# Patient Record
Sex: FEMALE | Race: OTHER
[De-identification: names, ages, dates, MRNs, and addresses within clinical notes are randomized per-mention and may not be internally consistent; named-entity substitution may affect disease eponyms.]

---

## 2018-03-14 PROBLEM — Z00.00 ENCOUNTER FOR PREVENTIVE HEALTH EXAMINATION: Status: ACTIVE | Noted: 2018-03-14

## 2018-04-12 ENCOUNTER — APPOINTMENT (OUTPATIENT)
Dept: OBGYN | Facility: CLINIC | Age: 23
End: 2018-04-12

## 2018-04-12 ENCOUNTER — OUTPATIENT (OUTPATIENT)
Dept: OUTPATIENT SERVICES | Facility: HOSPITAL | Age: 23
LOS: 1 days | Discharge: HOME | End: 2018-04-12

## 2018-04-12 ENCOUNTER — RESULT CHARGE (OUTPATIENT)
Age: 23
End: 2018-04-12

## 2018-04-12 VITALS
WEIGHT: 101.38 LBS | SYSTOLIC BLOOD PRESSURE: 100 MMHG | BODY MASS INDEX: 23.46 KG/M2 | DIASTOLIC BLOOD PRESSURE: 60 MMHG | HEIGHT: 55 IN

## 2018-04-12 LAB
HCG UR QL: NEGATIVE
QUALITY CONTROL: YES

## 2018-04-12 RX ORDER — MEDROXYPROGESTERONE ACETATE 150 MG/ML
150 INJECTION, SUSPENSION INTRAMUSCULAR
Qty: 0 | Refills: 0 | Status: COMPLETED | OUTPATIENT
Start: 2018-04-12

## 2018-04-12 RX ADMIN — MEDROXYPROGESTERONE ACETATE 0 MG/ML: 150 INJECTION, SUSPENSION INTRAMUSCULAR at 00:00

## 2018-04-13 DIAGNOSIS — Z30.42 ENCOUNTER FOR SURVEILLANCE OF INJECTABLE CONTRACEPTIVE: ICD-10-CM

## 2018-06-14 ENCOUNTER — APPOINTMENT (OUTPATIENT)
Dept: OBGYN | Facility: CLINIC | Age: 23
End: 2018-06-14

## 2018-06-14 ENCOUNTER — OUTPATIENT (OUTPATIENT)
Dept: OUTPATIENT SERVICES | Facility: HOSPITAL | Age: 23
LOS: 1 days | Discharge: HOME | End: 2018-06-14

## 2018-06-14 VITALS
DIASTOLIC BLOOD PRESSURE: 60 MMHG | HEIGHT: 55 IN | BODY MASS INDEX: 23.23 KG/M2 | WEIGHT: 100.38 LBS | SYSTOLIC BLOOD PRESSURE: 100 MMHG

## 2018-06-14 DIAGNOSIS — Z23 ENCOUNTER FOR IMMUNIZATION: ICD-10-CM

## 2018-06-18 DIAGNOSIS — Z23 ENCOUNTER FOR IMMUNIZATION: ICD-10-CM

## 2018-07-05 ENCOUNTER — APPOINTMENT (OUTPATIENT)
Dept: OBGYN | Facility: CLINIC | Age: 23
End: 2018-07-05

## 2018-07-05 ENCOUNTER — OUTPATIENT (OUTPATIENT)
Dept: OUTPATIENT SERVICES | Facility: HOSPITAL | Age: 23
LOS: 1 days | Discharge: HOME | End: 2018-07-05

## 2018-07-05 VITALS — WEIGHT: 102 LBS | DIASTOLIC BLOOD PRESSURE: 60 MMHG | SYSTOLIC BLOOD PRESSURE: 100 MMHG

## 2018-07-05 RX ORDER — MEDROXYPROGESTERONE ACETATE 150 MG/ML
150 INJECTION, SUSPENSION INTRAMUSCULAR
Qty: 1 | Refills: 0 | Status: ACTIVE | COMMUNITY
Start: 2018-07-05 | End: 1900-01-01

## 2018-07-09 DIAGNOSIS — Z30.42 ENCOUNTER FOR SURVEILLANCE OF INJECTABLE CONTRACEPTIVE: ICD-10-CM

## 2018-08-31 ENCOUNTER — APPOINTMENT (OUTPATIENT)
Dept: OTOLARYNGOLOGY | Facility: CLINIC | Age: 23
End: 2018-08-31

## 2018-09-07 ENCOUNTER — APPOINTMENT (OUTPATIENT)
Dept: GASTROENTEROLOGY | Facility: CLINIC | Age: 23
End: 2018-09-07

## 2018-09-27 ENCOUNTER — OUTPATIENT (OUTPATIENT)
Dept: OUTPATIENT SERVICES | Facility: HOSPITAL | Age: 23
LOS: 1 days | Discharge: HOME | End: 2018-09-27

## 2018-09-27 ENCOUNTER — APPOINTMENT (OUTPATIENT)
Dept: OBGYN | Facility: CLINIC | Age: 23
End: 2018-09-27

## 2018-09-27 VITALS
HEIGHT: 55 IN | BODY MASS INDEX: 23.23 KG/M2 | DIASTOLIC BLOOD PRESSURE: 60 MMHG | WEIGHT: 100.38 LBS | SYSTOLIC BLOOD PRESSURE: 110 MMHG

## 2018-09-28 DIAGNOSIS — Z30.42 ENCOUNTER FOR SURVEILLANCE OF INJECTABLE CONTRACEPTIVE: ICD-10-CM

## 2018-10-19 ENCOUNTER — APPOINTMENT (OUTPATIENT)
Dept: GASTROENTEROLOGY | Facility: CLINIC | Age: 23
End: 2018-10-19

## 2018-10-19 ENCOUNTER — OUTPATIENT (OUTPATIENT)
Dept: OUTPATIENT SERVICES | Facility: HOSPITAL | Age: 23
LOS: 1 days | Discharge: HOME | End: 2018-10-19

## 2018-10-19 VITALS — SYSTOLIC BLOOD PRESSURE: 106 MMHG | DIASTOLIC BLOOD PRESSURE: 75 MMHG | HEART RATE: 65 BPM

## 2018-10-25 ENCOUNTER — APPOINTMENT (OUTPATIENT)
Dept: OBGYN | Facility: CLINIC | Age: 23
End: 2018-10-25

## 2018-12-13 ENCOUNTER — APPOINTMENT (OUTPATIENT)
Dept: OBGYN | Facility: CLINIC | Age: 23
End: 2018-12-13

## 2018-12-13 ENCOUNTER — OUTPATIENT (OUTPATIENT)
Dept: OUTPATIENT SERVICES | Facility: HOSPITAL | Age: 23
LOS: 1 days | Discharge: HOME | End: 2018-12-13

## 2018-12-13 VITALS
DIASTOLIC BLOOD PRESSURE: 60 MMHG | BODY MASS INDEX: 22.45 KG/M2 | WEIGHT: 97 LBS | SYSTOLIC BLOOD PRESSURE: 104 MMHG | HEIGHT: 55 IN

## 2018-12-13 DIAGNOSIS — Z30.9 ENCOUNTER FOR CONTRACEPTIVE MANAGEMENT, UNSPECIFIED: ICD-10-CM

## 2018-12-13 RX ORDER — MEDROXYPROGESTERONE ACETATE 150 MG/ML
150 INJECTION, SUSPENSION INTRAMUSCULAR
Refills: 0 | Status: COMPLETED | OUTPATIENT
Start: 2018-12-13

## 2018-12-13 RX ADMIN — MEDROXYPROGESTERONE ACETATE 0 MG/ML: 150 INJECTION, SUSPENSION INTRAMUSCULAR at 00:00

## 2018-12-14 DIAGNOSIS — Z23 ENCOUNTER FOR IMMUNIZATION: ICD-10-CM

## 2018-12-14 DIAGNOSIS — Z30.42 ENCOUNTER FOR SURVEILLANCE OF INJECTABLE CONTRACEPTIVE: ICD-10-CM

## 2019-01-02 ENCOUNTER — OUTPATIENT (OUTPATIENT)
Dept: OUTPATIENT SERVICES | Facility: HOSPITAL | Age: 24
LOS: 1 days | Discharge: HOME | End: 2019-01-02

## 2019-01-02 ENCOUNTER — TRANSCRIPTION ENCOUNTER (OUTPATIENT)
Age: 24
End: 2019-01-02

## 2019-01-02 ENCOUNTER — RESULT REVIEW (OUTPATIENT)
Age: 24
End: 2019-01-02

## 2019-01-02 VITALS
TEMPERATURE: 99 F | RESPIRATION RATE: 20 BRPM | DIASTOLIC BLOOD PRESSURE: 65 MMHG | WEIGHT: 98.11 LBS | HEIGHT: 57 IN | SYSTOLIC BLOOD PRESSURE: 98 MMHG | HEART RATE: 65 BPM

## 2019-01-02 VITALS
DIASTOLIC BLOOD PRESSURE: 58 MMHG | HEART RATE: 58 BPM | OXYGEN SATURATION: 100 % | SYSTOLIC BLOOD PRESSURE: 107 MMHG | RESPIRATION RATE: 16 BRPM

## 2019-01-02 DIAGNOSIS — R10.13 EPIGASTRIC PAIN: ICD-10-CM

## 2019-01-02 DIAGNOSIS — B96.81 HELICOBACTER PYLORI [H. PYLORI] AS THE CAUSE OF DISEASES CLASSIFIED ELSEWHERE: ICD-10-CM

## 2019-01-02 DIAGNOSIS — K29.40 CHRONIC ATROPHIC GASTRITIS WITHOUT BLEEDING: ICD-10-CM

## 2019-01-02 NOTE — H&P PST ADULT - ASSESSMENT
A 22 y old female patient previously healthy presented for epigastric pain. Hx goes back 3 month PTP when she started to complain of constant dull, burning like epigastric pain, worse with spicy food, was prescribed omeprazole for 1 month with significant improvement but she did not get refill so the last month her pain got worse and it is associated with nausea. She denies any vomiting, RBPR, Melena, dysphagia, family hx of cancer, weight loss.  She denies smoking or using alcohol     # Epigastric burning pain rule out gastritis/ PUD, no alarm signs   Patient had a PPI trial and symptoms recurred   Avoid spicy food and red sauce  Will order PPi   Will schedule for EGD

## 2019-01-02 NOTE — H&P PST ADULT - HISTORY OF PRESENT ILLNESS
A 22 y old female patient previously healthy presented for epigastric pain. Hx goes back 3 month PTP when she started to complain of constant dull, burning like epigastric pain, worse with spicy food, was prescribed omeprazole for 1 month with significant improvement but she did not get refill so the last month her pain got worse and it is associated with nausea. She denies any vomiting, RBPR, Melena, dysphagia, family hx of cancer, weight loss.  She denies smoking or using alcohol

## 2019-01-04 LAB — SURGICAL PATHOLOGY STUDY: SIGNIFICANT CHANGE UP

## 2019-01-15 PROBLEM — K29.70 GASTRITIS, UNSPECIFIED, WITHOUT BLEEDING: Chronic | Status: ACTIVE | Noted: 2019-01-02

## 2019-02-22 ENCOUNTER — OUTPATIENT (OUTPATIENT)
Dept: OUTPATIENT SERVICES | Facility: HOSPITAL | Age: 24
LOS: 1 days | Discharge: HOME | End: 2019-02-22

## 2019-02-22 ENCOUNTER — APPOINTMENT (OUTPATIENT)
Dept: GASTROENTEROLOGY | Facility: CLINIC | Age: 24
End: 2019-02-22

## 2019-02-22 VITALS — WEIGHT: 105 LBS

## 2019-02-22 VITALS — DIASTOLIC BLOOD PRESSURE: 65 MMHG | SYSTOLIC BLOOD PRESSURE: 106 MMHG

## 2019-02-22 NOTE — PHYSICAL EXAM
[General Appearance - Alert] : alert [Sclera] : the sclera and conjunctiva were normal [Hearing Threshold Finger Rub Not [place]] : hearing was normal [] : no respiratory distress [Exaggerated Use Of Accessory Muscles For Inspiration] : no accessory muscle use [Auscultation Breath Sounds / Voice Sounds] : lungs were clear to auscultation bilaterally [Heart Rate And Rhythm] : heart rate was normal and rhythm regular [Heart Sounds] : normal S1 and S2 [Edema] : there was no peripheral edema [Bowel Sounds] : normal bowel sounds [Abdomen Soft] : soft [No CVA Tenderness] : no ~M costovertebral angle tenderness [Abnormal Walk] : normal gait [No Focal Deficits] : no focal deficits [Oriented To Time, Place, And Person] : oriented to person, place, and time [FreeTextEntry1] : epigastric tenderness

## 2019-02-22 NOTE — HISTORY OF PRESENT ILLNESS
[de-identified] : 23 year old female came here for follow up after endoscopy on 2nd January 2019. \par endoscopy was done for epigastric pain, she had PPI trial and symptoms recurred\par EGD jan 2019: Showed erythema in antrum and body of stomach, biopsy positive for mod to severe gastritis and scattered hyperplastic lymphoid aggregates, H.Pylori positive.

## 2019-02-22 NOTE — REVIEW OF SYSTEMS
[Negative] : Heme/Lymph [Abdominal Pain] : abdominal pain [Constipation] : no constipation [Diarrhea] : no diarrhea

## 2019-02-22 NOTE — ASSESSMENT
[FreeTextEntry1] : 22 year old female came here for follow up after endoscopy on 2nd January 2019. Showed erythema in antrum and body of stomach, biopsy positive for mod to severe gastritis and scattered hyperplastic lymphoid aggregates, H.Pylori positive. \par \par #)Epigastric pain likely due to Mod to sever gastritis, Positive H.Pylori\par -will start her on triple therapy\par -follow up in 2 months

## 2019-03-14 ENCOUNTER — OUTPATIENT (OUTPATIENT)
Dept: OUTPATIENT SERVICES | Facility: HOSPITAL | Age: 24
LOS: 1 days | Discharge: HOME | End: 2019-03-14

## 2019-03-14 ENCOUNTER — APPOINTMENT (OUTPATIENT)
Dept: OBGYN | Facility: CLINIC | Age: 24
End: 2019-03-14

## 2019-03-14 VITALS
HEIGHT: 55 IN | BODY MASS INDEX: 23.14 KG/M2 | DIASTOLIC BLOOD PRESSURE: 54 MMHG | SYSTOLIC BLOOD PRESSURE: 90 MMHG | WEIGHT: 100 LBS

## 2019-03-14 DIAGNOSIS — Z86.19 PERSONAL HISTORY OF OTHER INFECTIOUS AND PARASITIC DISEASES: ICD-10-CM

## 2019-03-14 RX ADMIN — MEDROXYPROGESTERONE ACETATE 0 MG/ML: 150 INJECTION, SUSPENSION INTRAMUSCULAR at 00:00

## 2019-03-14 NOTE — BEGINNING OF VISIT
[Patient] : patient [] :  [[employer] ] : [employer]   [FreeTextEntry1] : [id number], [id number]

## 2019-03-14 NOTE — END OF VISIT
[] : Resident [FreeTextEntry3] : 22 y/o with uncomplicated annual with likely candida. Pap done, aptima swab done. DMPA administered. Follow up 3 moths for depo provera.

## 2019-03-14 NOTE — HISTORY OF PRESENT ILLNESS
[Last Pap ___] : Last cervical pap smear was [unfilled] [Definite:  ___ (Date)] : the last menstrual period was [unfilled] [Regular Cycle Intervals] : periods have been regular [Frequency: Q ___ days] : menstrual periods occur approximately every [unfilled] days

## 2019-03-15 DIAGNOSIS — Z34.83 ENCOUNTER FOR SUPERVISION OF OTHER NORMAL PREGNANCY, THIRD TRIMESTER: ICD-10-CM

## 2019-03-21 LAB
A VAGINAE DNA VAG QL NAA+PROBE: ABNORMAL
BVAB2 DNA VAG QL NAA+PROBE: NORMAL
C KRUSEI DNA VAG QL NAA+PROBE: NEGATIVE
C TRACH RRNA SPEC QL NAA+PROBE: NEGATIVE
MEGA1 DNA VAG QL NAA+PROBE: NORMAL
N GONORRHOEA RRNA SPEC QL NAA+PROBE: NEGATIVE
T VAGINALIS RRNA SPEC QL NAA+PROBE: NEGATIVE

## 2019-04-26 ENCOUNTER — APPOINTMENT (OUTPATIENT)
Dept: GASTROENTEROLOGY | Facility: CLINIC | Age: 24
End: 2019-04-26

## 2019-06-06 ENCOUNTER — APPOINTMENT (OUTPATIENT)
Dept: OBGYN | Facility: CLINIC | Age: 24
End: 2019-06-06
Payer: MEDICAID

## 2019-06-06 ENCOUNTER — OUTPATIENT (OUTPATIENT)
Dept: OUTPATIENT SERVICES | Facility: HOSPITAL | Age: 24
LOS: 1 days | Discharge: HOME | End: 2019-06-06

## 2019-06-06 VITALS
SYSTOLIC BLOOD PRESSURE: 92 MMHG | HEIGHT: 55 IN | WEIGHT: 94 LBS | BODY MASS INDEX: 21.76 KG/M2 | DIASTOLIC BLOOD PRESSURE: 58 MMHG

## 2019-06-06 PROCEDURE — 99212 OFFICE O/P EST SF 10 MIN: CPT

## 2019-06-06 RX ORDER — CALCIUM CITRATE/VITAMIN D3 315MG-6.25
315-200 TABLET ORAL DAILY
Qty: 30 | Refills: 5 | Status: ACTIVE | COMMUNITY
Start: 2019-06-06 | End: 1900-01-01

## 2019-06-06 RX ADMIN — MEDROXYPROGESTERONE ACETATE 0 MG/ML: 150 INJECTION, SUSPENSION INTRAMUSCULAR at 00:00

## 2019-06-06 NOTE — END OF VISIT
[] : Resident [FreeTextEntry3] : Uncomplicated DMPA administration. RTC 3 months for repeat DMPA. Annual due 3/2020

## 2019-06-10 DIAGNOSIS — Z30.42 ENCOUNTER FOR SURVEILLANCE OF INJECTABLE CONTRACEPTIVE: ICD-10-CM

## 2019-07-18 ENCOUNTER — OUTPATIENT (OUTPATIENT)
Dept: OUTPATIENT SERVICES | Facility: HOSPITAL | Age: 24
LOS: 1 days | Discharge: HOME | End: 2019-07-18

## 2019-07-19 DIAGNOSIS — Z00.00 ENCOUNTER FOR GENERAL ADULT MEDICAL EXAMINATION WITHOUT ABNORMAL FINDINGS: ICD-10-CM

## 2019-08-22 ENCOUNTER — OUTPATIENT (OUTPATIENT)
Dept: OUTPATIENT SERVICES | Facility: HOSPITAL | Age: 24
LOS: 1 days | Discharge: HOME | End: 2019-08-22

## 2019-08-22 ENCOUNTER — APPOINTMENT (OUTPATIENT)
Dept: OBGYN | Facility: CLINIC | Age: 24
End: 2019-08-22
Payer: MEDICAID

## 2019-08-22 VITALS — DIASTOLIC BLOOD PRESSURE: 60 MMHG | WEIGHT: 98 LBS | SYSTOLIC BLOOD PRESSURE: 90 MMHG

## 2019-08-22 PROCEDURE — 99212 OFFICE O/P EST SF 10 MIN: CPT

## 2019-08-22 NOTE — END OF VISIT
[] : Resident [FreeTextEntry3] : Uncomplicated DMPA administration. Follow up 3 months for repeat DMPA. Annual due 3/2020

## 2019-08-26 DIAGNOSIS — Z30.42 ENCOUNTER FOR SURVEILLANCE OF INJECTABLE CONTRACEPTIVE: ICD-10-CM

## 2019-10-11 ENCOUNTER — APPOINTMENT (OUTPATIENT)
Dept: GASTROENTEROLOGY | Facility: CLINIC | Age: 24
End: 2019-10-11
Payer: MEDICAID

## 2019-10-11 ENCOUNTER — OUTPATIENT (OUTPATIENT)
Dept: OUTPATIENT SERVICES | Facility: HOSPITAL | Age: 24
LOS: 1 days | Discharge: HOME | End: 2019-10-11

## 2019-10-11 DIAGNOSIS — A04.8 OTHER SPECIFIED BACTERIAL INTESTINAL INFECTIONS: ICD-10-CM

## 2019-10-11 PROCEDURE — 99214 OFFICE O/P EST MOD 30 MIN: CPT

## 2019-10-11 NOTE — PHYSICAL EXAM
[General Appearance - Alert] : alert [General Appearance - In No Acute Distress] : in no acute distress [Sclera] : the sclera and conjunctiva were normal [Outer Ear] : the ears and nose were normal in appearance [Neck Appearance] : the appearance of the neck was normal [Apical Impulse] : the apical impulse was normal [Breast Appearance] : normal in appearance [Bowel Sounds] : normal bowel sounds [Abdomen Soft] : soft [No CVA Tenderness] : no ~M costovertebral angle tenderness [Abnormal Walk] : normal gait [Skin Color & Pigmentation] : normal skin color and pigmentation [Oriented To Time, Place, And Person] : oriented to person, place, and time

## 2019-10-11 NOTE — HISTORY OF PRESENT ILLNESS
[de-identified] : 23 year old female came here for follow up after endoscopy on 2nd January 2019. \par endoscopy was done for epigastric pain, she had PPI trial and symptoms recurred\par EGD jan 2019: Showed erythema in antrum and body of stomach, biopsy positive for mod to severe gastritis and scattered hyperplastic lymphoid aggregates, H.Pylori positive.\par \par Interval Hx:\par SP triple therapy\par feels better with protonix 40 mg QD\par \par

## 2019-11-06 LAB — H PYLORI AG STL QL: DETECTED

## 2019-11-14 ENCOUNTER — OUTPATIENT (OUTPATIENT)
Dept: OUTPATIENT SERVICES | Facility: HOSPITAL | Age: 24
LOS: 1 days | Discharge: HOME | End: 2019-11-14

## 2019-11-14 ENCOUNTER — APPOINTMENT (OUTPATIENT)
Dept: OBGYN | Facility: CLINIC | Age: 24
End: 2019-11-14
Payer: MEDICAID

## 2019-11-14 VITALS
HEIGHT: 55 IN | BODY MASS INDEX: 23.14 KG/M2 | DIASTOLIC BLOOD PRESSURE: 60 MMHG | WEIGHT: 100 LBS | SYSTOLIC BLOOD PRESSURE: 90 MMHG

## 2019-11-14 DIAGNOSIS — Z30.42 ENCOUNTER FOR SURVEILLANCE OF INJECTABLE CONTRACEPTIVE: ICD-10-CM

## 2019-11-14 PROCEDURE — 99212 OFFICE O/P EST SF 10 MIN: CPT

## 2019-11-14 RX ORDER — CLARITHROMYCIN 500 MG/1
500 TABLET, FILM COATED ORAL
Qty: 28 | Refills: 0 | Status: DISCONTINUED | COMMUNITY
Start: 2019-02-22 | End: 2019-11-14

## 2019-11-14 RX ORDER — PANTOPRAZOLE 40 MG/1
40 TABLET, DELAYED RELEASE ORAL TWICE DAILY
Qty: 1 | Refills: 1 | Status: DISCONTINUED | COMMUNITY
Start: 2018-10-19 | End: 2019-11-14

## 2019-11-14 RX ORDER — TERCONAZOLE 8 MG/G
0.8 CREAM VAGINAL
Qty: 1 | Refills: 1 | Status: DISCONTINUED | COMMUNITY
Start: 2019-03-14 | End: 2019-11-14

## 2019-11-14 RX ORDER — MULTIVITAMIN/IRON/FOLIC ACID 18MG-0.4MG
600-400 TABLET ORAL
Qty: 1 | Refills: 3 | Status: DISCONTINUED | COMMUNITY
Start: 2018-12-13 | End: 2019-11-14

## 2019-11-14 RX ORDER — AMOXICILLIN 500 MG/1
500 TABLET, FILM COATED ORAL
Qty: 56 | Refills: 0 | Status: DISCONTINUED | COMMUNITY
Start: 2019-02-22 | End: 2019-11-14

## 2019-11-14 RX ADMIN — MEDROXYPROGESTERONE ACETATE 0 MG/ML: 150 INJECTION, SUSPENSION INTRAMUSCULAR at 00:00

## 2019-11-14 NOTE — END OF VISIT
[] : Resident [FreeTextEntry3] : Patient here for DMPA. Administered without complication. RTC 3 months for next injection.

## 2019-11-20 DIAGNOSIS — Z30.42 ENCOUNTER FOR SURVEILLANCE OF INJECTABLE CONTRACEPTIVE: ICD-10-CM

## 2019-11-22 ENCOUNTER — OUTPATIENT (OUTPATIENT)
Dept: OUTPATIENT SERVICES | Facility: HOSPITAL | Age: 24
LOS: 1 days | Discharge: HOME | End: 2019-11-22

## 2019-11-22 ENCOUNTER — APPOINTMENT (OUTPATIENT)
Dept: GASTROENTEROLOGY | Facility: CLINIC | Age: 24
End: 2019-11-22
Payer: MEDICAID

## 2019-11-22 VITALS
HEART RATE: 88 BPM | HEIGHT: 55 IN | WEIGHT: 100 LBS | BODY MASS INDEX: 23.14 KG/M2 | DIASTOLIC BLOOD PRESSURE: 61 MMHG | SYSTOLIC BLOOD PRESSURE: 98 MMHG

## 2019-11-22 PROCEDURE — 99214 OFFICE O/P EST MOD 30 MIN: CPT

## 2019-11-22 NOTE — HISTORY OF PRESENT ILLNESS
[___ Month(s) Ago] : [unfilled] month(s) ago [Ordering Test(s) ___] : ordering [unfilled] [None] : had no significant interval events [Heartburn] : stable heartburn [Nausea] : denies nausea [Vomiting] : denies vomiting [Diarrhea] : denies diarrhea [Constipation] : denies constipation [Yellow Skin Or Eyes (Jaundice)] : denies jaundice [Abdominal Pain] : denies abdominal pain [Abdominal Swelling] : denies abdominal swelling [Rectal Pain] : denies rectal pain [de-identified] : 24 year old female with h/o H.Pylori gastritis in endoscopy jan 2019 s/p triple therapy completed in march came to clinic for follow up to know the results of stool antigen test. \par She completed triple therapy stopped PPI for 2 weeks for stool antigen test which was don priscilla oct 31 showed positive. Currently she c/o epigastric burning 4 times/week after eating lasts for few minutes. She stopped PPI 2 weeks ago. Denies any nausea, vomiting, hematemesis, melena, hematochezia, weight loss.

## 2019-11-22 NOTE — ASSESSMENT
[FreeTextEntry1] : 24 year old female with h/o H.Pylori gastritis in endoscopy jan 2019 s/p triple therapy completed in march came to clinic for follow up to know the results of stool antigen test which was positive. \par \par #)H.Pylori gastritis failed triple therapy with PPI, amoxicillin, clarithromycin\par -Repeat stool antigen off PPI for 2 weeks was positive\par -No evidence of alarm signs and symptoms\par -Recommended PP, amoxicillin, levofloxacin \par -Advised to take the medication for 14 days and repeat stool antigen 2 weeks off PPI\par -Avoid NSAID\par -Follow up in 2 months with stool antigen test after completing treatment\par \par \par

## 2019-11-22 NOTE — PHYSICAL EXAM
[General Appearance - Alert] : alert [General Appearance - In No Acute Distress] : in no acute distress [Sclera] : the sclera and conjunctiva were normal [Hearing Threshold Finger Rub Not [place]] : hearing was normal [] : no respiratory distress [Exaggerated Use Of Accessory Muscles For Inspiration] : no accessory muscle use [Auscultation Breath Sounds / Voice Sounds] : lungs were clear to auscultation bilaterally [Edema] : there was no peripheral edema [Bowel Sounds] : normal bowel sounds [Abdomen Soft] : soft [Abdomen Tenderness] : non-tender [No CVA Tenderness] : no ~M costovertebral angle tenderness [Abnormal Walk] : normal gait [No Focal Deficits] : no focal deficits [Oriented To Time, Place, And Person] : oriented to person, place, and time

## 2020-01-30 ENCOUNTER — OUTPATIENT (OUTPATIENT)
Dept: OUTPATIENT SERVICES | Facility: HOSPITAL | Age: 25
LOS: 1 days | Discharge: HOME | End: 2020-01-30

## 2020-01-30 ENCOUNTER — APPOINTMENT (OUTPATIENT)
Dept: OBGYN | Facility: CLINIC | Age: 25
End: 2020-01-30
Payer: MEDICAID

## 2020-01-30 VITALS
SYSTOLIC BLOOD PRESSURE: 90 MMHG | DIASTOLIC BLOOD PRESSURE: 58 MMHG | BODY MASS INDEX: 23.38 KG/M2 | WEIGHT: 101.02 LBS | HEIGHT: 55 IN

## 2020-01-30 DIAGNOSIS — Z30.42 ENCOUNTER FOR SURVEILLANCE OF INJECTABLE CONTRACEPTIVE: ICD-10-CM

## 2020-01-30 PROCEDURE — 99212 OFFICE O/P EST SF 10 MIN: CPT

## 2020-01-30 NOTE — END OF VISIT
[] : Resident [FreeTextEntry3] : Agree with resident note. Uncomplicated DMPA. Follow up 3 months for repeat DMPA.

## 2020-01-31 LAB — H PYLORI AG STL QL: POSITIVE

## 2020-02-28 ENCOUNTER — APPOINTMENT (OUTPATIENT)
Dept: GASTROENTEROLOGY | Facility: CLINIC | Age: 25
End: 2020-02-28
Payer: MEDICAID

## 2020-02-28 ENCOUNTER — OUTPATIENT (OUTPATIENT)
Dept: OUTPATIENT SERVICES | Facility: HOSPITAL | Age: 25
LOS: 1 days | Discharge: HOME | End: 2020-02-28

## 2020-02-28 VITALS
DIASTOLIC BLOOD PRESSURE: 61 MMHG | SYSTOLIC BLOOD PRESSURE: 96 MMHG | TEMPERATURE: 98.3 F | HEIGHT: 55 IN | WEIGHT: 104 LBS | HEART RATE: 82 BPM | BODY MASS INDEX: 24.07 KG/M2

## 2020-02-28 DIAGNOSIS — Z78.9 OTHER SPECIFIED HEALTH STATUS: ICD-10-CM

## 2020-02-28 DIAGNOSIS — A04.8 OTHER SPECIFIED BACTERIAL INTESTINAL INFECTIONS: ICD-10-CM

## 2020-02-28 DIAGNOSIS — K29.70 GASTRITIS, UNSPECIFIED, W/OUT BLEEDING: ICD-10-CM

## 2020-02-28 DIAGNOSIS — Z86.19 PERSONAL HISTORY OF OTHER INFECTIOUS AND PARASITIC DISEASES: ICD-10-CM

## 2020-02-28 PROCEDURE — 99214 OFFICE O/P EST MOD 30 MIN: CPT

## 2020-02-28 RX ORDER — TETRACYCLINE HYDROCHLORIDE 500 MG/1
500 CAPSULE ORAL EVERY 6 HOURS
Qty: 56 | Refills: 0 | Status: ACTIVE | COMMUNITY
Start: 2020-02-28 | End: 1900-01-01

## 2020-02-28 RX ORDER — LEVOFLOXACIN 500 MG/1
500 TABLET, FILM COATED ORAL DAILY
Qty: 14 | Refills: 0 | Status: DISCONTINUED | COMMUNITY
Start: 2019-11-22 | End: 2020-02-28

## 2020-02-28 RX ORDER — PANTOPRAZOLE 40 MG/1
40 TABLET, DELAYED RELEASE ORAL
Qty: 30 | Refills: 0 | Status: ACTIVE | COMMUNITY
Start: 2020-02-28 | End: 1900-01-01

## 2020-02-28 RX ORDER — AMOXICILLIN 500 MG/1
500 CAPSULE ORAL TWICE DAILY
Qty: 56 | Refills: 0 | Status: DISCONTINUED | COMMUNITY
Start: 2019-11-22 | End: 2020-02-28

## 2020-02-28 RX ORDER — OMEPRAZOLE 20 MG/1
20 TABLET, DELAYED RELEASE ORAL
Qty: 60 | Refills: 0 | Status: DISCONTINUED | COMMUNITY
Start: 2019-11-22 | End: 2020-02-28

## 2020-02-28 RX ORDER — BISMUTH SUBSALICYLATE 262 MG/1
262 TABLET, CHEWABLE ORAL 4 TIMES DAILY
Qty: 112 | Refills: 0 | Status: ACTIVE | COMMUNITY
Start: 2020-02-28 | End: 1900-01-01

## 2020-02-28 RX ORDER — METRONIDAZOLE 500 MG/1
500 TABLET ORAL EVERY 6 HOURS
Qty: 60 | Refills: 0 | Status: ACTIVE | COMMUNITY
Start: 2020-02-28 | End: 1900-01-01

## 2020-02-28 NOTE — ASSESSMENT
[FreeTextEntry1] : 24 year old female with h/o H.Pylori gastritis in endoscopy jan 2019 s/p failed triple therapy as well as failed triple therapy with fluoroquinolone therapy, without alarming symtoms. \par \par #H.Pylori gastritis failed triple therapy with PPI, amoxicillin, clarithromycin and triple therapy with fluoroquinolone\par -Repeat stool antigen off PPI for 2 weeks was positive\par -No evidence of alarming signs and symptoms\par -plan for quadruple therapy x14 days: protonix 40 BID, flagyl 500 QID, tetracycline 500 QID, and bismuth 262 2- tbs QID\par -  repeat stool Ag testing\par - pt advised on another barrier for birth control while on quadruple therapy\par -advised on probiotics use PRN (reports candiasis of vulva prior)\par \par -Avoid NSAIDs\par -Follow up in 3 months

## 2020-02-28 NOTE — PHYSICAL EXAM
[General Appearance - Alert] : alert [General Appearance - Well Nourished] : well nourished [General Appearance - Well Developed] : well developed [General Appearance - Well-Appearing] : healthy appearing [Sclera] : the sclera and conjunctiva were normal [Hearing Threshold Finger Rub Not [place]] : hearing was normal [Thyroid Diffuse Enlargement] : the thyroid was not enlarged [Auscultation Breath Sounds / Voice Sounds] : lungs were clear to auscultation bilaterally [Heart Sounds] : normal S1 and S2 [Bowel Sounds] : normal bowel sounds [Abdomen Soft] : soft [Abdomen Tenderness] : non-tender [Skin Color & Pigmentation] : normal skin color and pigmentation [Sensation] : the sensory exam was normal to light touch and pinprick [No Focal Deficits] : no focal deficits [Oriented To Time, Place, And Person] : oriented to person, place, and time [No CVA Tenderness] : no ~M costovertebral angle tenderness [Abnormal Walk] : normal gait

## 2020-02-28 NOTE — HISTORY OF PRESENT ILLNESS
[Heartburn] : improved heartburn [Nausea] : denies nausea [Vomiting] : denies vomiting [Diarrhea] : denies diarrhea [Constipation] : denies constipation [Yellow Skin Or Eyes (Jaundice)] : denies jaundice [Abdominal Pain] : denies abdominal pain [Abdominal Swelling] : denies abdominal swelling [Good Compliance] : good compliance with treatment [de-identified] : 24 year old female with h/o H.Pylori gastritis in endoscopy jan 2019 s/p triple therapy, failed with PPI, amoxicillin, and clarithromycin, s/p PPI, amox, and levofloxacin therapy the 2nd time, here for f/u for results of stool Ag testing. \par \par - repeat stool Ag testing still  positive. Still taking protonix once a day,  denies any nausea, vomiting, hematemesis, melena, hematochezia, weight loss. Reports intermittent stomach pain after eating that self-resolves.

## 2020-04-16 ENCOUNTER — APPOINTMENT (OUTPATIENT)
Dept: OBGYN | Facility: CLINIC | Age: 25
End: 2020-04-16
Payer: MEDICAID

## 2020-04-16 ENCOUNTER — APPOINTMENT (OUTPATIENT)
Dept: OBGYN | Facility: CLINIC | Age: 25
End: 2020-04-16

## 2020-04-16 ENCOUNTER — OUTPATIENT (OUTPATIENT)
Dept: OUTPATIENT SERVICES | Facility: HOSPITAL | Age: 25
LOS: 1 days | Discharge: HOME | End: 2020-04-16

## 2020-04-16 VITALS
SYSTOLIC BLOOD PRESSURE: 96 MMHG | DIASTOLIC BLOOD PRESSURE: 60 MMHG | WEIGHT: 110 LBS | BODY MASS INDEX: 25.46 KG/M2 | HEIGHT: 55 IN

## 2020-04-16 DIAGNOSIS — Z3A.38 38 WEEKS GESTATION OF PREGNANCY: ICD-10-CM

## 2020-04-16 DIAGNOSIS — Z3A.24 24 WEEKS GESTATION OF PREGNANCY: ICD-10-CM

## 2020-04-16 DIAGNOSIS — Z34.92 ENCOUNTER FOR SUPERVISION OF NORMAL PREGNANCY, UNSPECIFIED, SECOND TRIMESTER: ICD-10-CM

## 2020-04-16 PROCEDURE — 76815 OB US LIMITED FETUS(S): CPT | Mod: 26

## 2020-04-16 PROCEDURE — 99213 OFFICE O/P EST LOW 20 MIN: CPT | Mod: 25

## 2020-04-16 NOTE — END OF VISIT
[] : Resident [FreeTextEntry3] : Patient initially presented for DMPA injection, found to be 24 weeks pregnant. Sent for prenatal labs and official sonogram. RN new ob portion done today. Follow up 4 weeks. Full physical exam at that time. [phone]

## 2020-04-16 NOTE — HISTORY OF PRESENT ILLNESS
[___ Month(s) Ago] : [unfilled] month(s) ago [Good] : being in good health [Reproductive Age] : is of reproductive age

## 2020-04-23 ENCOUNTER — ASOB RESULT (OUTPATIENT)
Age: 25
End: 2020-04-23

## 2020-04-23 ENCOUNTER — OUTPATIENT (OUTPATIENT)
Dept: OUTPATIENT SERVICES | Facility: HOSPITAL | Age: 25
LOS: 1 days | Discharge: HOME | End: 2020-04-23

## 2020-04-23 ENCOUNTER — APPOINTMENT (OUTPATIENT)
Dept: ANTEPARTUM | Facility: CLINIC | Age: 25
End: 2020-04-23
Payer: MEDICAID

## 2020-04-23 ENCOUNTER — LABORATORY RESULT (OUTPATIENT)
Age: 25
End: 2020-04-23

## 2020-04-23 PROCEDURE — 99212 OFFICE O/P EST SF 10 MIN: CPT

## 2020-04-23 PROCEDURE — T1013: CPT

## 2020-04-23 PROCEDURE — 76811 OB US DETAILED SNGL FETUS: CPT | Mod: 26

## 2020-04-24 DIAGNOSIS — O35.9XX0 MATERNAL CARE FOR (SUSPECTED) FETAL ABNORMALITY AND DAMAGE, UNSPECIFIED, NOT APPLICABLE OR UNSPECIFIED: ICD-10-CM

## 2020-04-24 DIAGNOSIS — O36.8390 MATERNAL CARE FOR ABNORMALITIES OF THE FETAL HEART RATE OR RHYTHM, UNSPECIFIED TRIMESTER, NOT APPLICABLE OR UNSPECIFIED: ICD-10-CM

## 2020-04-24 DIAGNOSIS — Z3A.24 24 WEEKS GESTATION OF PREGNANCY: ICD-10-CM

## 2020-04-28 ENCOUNTER — EMERGENCY (EMERGENCY)
Facility: HOSPITAL | Age: 25
LOS: 0 days | Discharge: HOME | End: 2020-04-29
Attending: EMERGENCY MEDICINE | Admitting: EMERGENCY MEDICINE
Payer: MEDICAID

## 2020-04-28 VITALS
SYSTOLIC BLOOD PRESSURE: 103 MMHG | HEART RATE: 119 BPM | OXYGEN SATURATION: 98 % | RESPIRATION RATE: 17 BRPM | DIASTOLIC BLOOD PRESSURE: 58 MMHG | TEMPERATURE: 98 F

## 2020-04-28 DIAGNOSIS — O21.9 VOMITING OF PREGNANCY, UNSPECIFIED: ICD-10-CM

## 2020-04-28 DIAGNOSIS — R51 HEADACHE: ICD-10-CM

## 2020-04-28 DIAGNOSIS — B34.9 VIRAL INFECTION, UNSPECIFIED: ICD-10-CM

## 2020-04-28 DIAGNOSIS — Z34.82 ENCOUNTER FOR SUPERVISION OF OTHER NORMAL PREGNANCY, SECOND TRIMESTER: ICD-10-CM

## 2020-04-28 LAB
ANION GAP SERPL CALC-SCNC: 13 MMOL/L — SIGNIFICANT CHANGE UP (ref 7–14)
APPEARANCE UR: ABNORMAL
BASOPHILS # BLD AUTO: 0.01 K/UL — SIGNIFICANT CHANGE UP (ref 0–0.2)
BASOPHILS NFR BLD AUTO: 0.2 % — SIGNIFICANT CHANGE UP (ref 0–1)
BILIRUB UR-MCNC: NEGATIVE — SIGNIFICANT CHANGE UP
BUN SERPL-MCNC: 3 MG/DL — LOW (ref 10–20)
CALCIUM SERPL-MCNC: 8.1 MG/DL — LOW (ref 8.5–10.1)
CHLORIDE SERPL-SCNC: 102 MMOL/L — SIGNIFICANT CHANGE UP (ref 98–110)
CO2 SERPL-SCNC: 21 MMOL/L — SIGNIFICANT CHANGE UP (ref 17–32)
COLOR SPEC: COLORLESS — SIGNIFICANT CHANGE UP
CREAT SERPL-MCNC: 0.5 MG/DL — LOW (ref 0.7–1.5)
DIFF PNL FLD: NEGATIVE — SIGNIFICANT CHANGE UP
EOSINOPHIL # BLD AUTO: 0.02 K/UL — SIGNIFICANT CHANGE UP (ref 0–0.7)
EOSINOPHIL NFR BLD AUTO: 0.4 % — SIGNIFICANT CHANGE UP (ref 0–8)
GLUCOSE SERPL-MCNC: 88 MG/DL — SIGNIFICANT CHANGE UP (ref 70–99)
GLUCOSE UR QL: NEGATIVE — SIGNIFICANT CHANGE UP
HCT VFR BLD CALC: 34.4 % — LOW (ref 37–47)
HGB BLD-MCNC: 12 G/DL — SIGNIFICANT CHANGE UP (ref 12–16)
IMM GRANULOCYTES NFR BLD AUTO: 1 % — HIGH (ref 0.1–0.3)
KETONES UR-MCNC: NEGATIVE — SIGNIFICANT CHANGE UP
LEUKOCYTE ESTERASE UR-ACNC: NEGATIVE — SIGNIFICANT CHANGE UP
LYMPHOCYTES # BLD AUTO: 1.23 K/UL — SIGNIFICANT CHANGE UP (ref 1.2–3.4)
LYMPHOCYTES # BLD AUTO: 23.5 % — SIGNIFICANT CHANGE UP (ref 20.5–51.1)
MAGNESIUM SERPL-MCNC: 2.1 MG/DL — SIGNIFICANT CHANGE UP (ref 1.8–2.4)
MCHC RBC-ENTMCNC: 30.8 PG — SIGNIFICANT CHANGE UP (ref 27–31)
MCHC RBC-ENTMCNC: 34.9 G/DL — SIGNIFICANT CHANGE UP (ref 32–37)
MCV RBC AUTO: 88.4 FL — SIGNIFICANT CHANGE UP (ref 81–99)
MONOCYTES # BLD AUTO: 0.28 K/UL — SIGNIFICANT CHANGE UP (ref 0.1–0.6)
MONOCYTES NFR BLD AUTO: 5.4 % — SIGNIFICANT CHANGE UP (ref 1.7–9.3)
NEUTROPHILS # BLD AUTO: 3.64 K/UL — SIGNIFICANT CHANGE UP (ref 1.4–6.5)
NEUTROPHILS NFR BLD AUTO: 69.5 % — SIGNIFICANT CHANGE UP (ref 42.2–75.2)
NITRITE UR-MCNC: NEGATIVE — SIGNIFICANT CHANGE UP
NRBC # BLD: 0 /100 WBCS — SIGNIFICANT CHANGE UP (ref 0–0)
PH UR: 7 — SIGNIFICANT CHANGE UP (ref 5–8)
PLATELET # BLD AUTO: 227 K/UL — SIGNIFICANT CHANGE UP (ref 130–400)
POTASSIUM SERPL-MCNC: 3.8 MMOL/L — SIGNIFICANT CHANGE UP (ref 3.5–5)
POTASSIUM SERPL-SCNC: 3.8 MMOL/L — SIGNIFICANT CHANGE UP (ref 3.5–5)
PROT UR-MCNC: NEGATIVE — SIGNIFICANT CHANGE UP
RBC # BLD: 3.89 M/UL — LOW (ref 4.2–5.4)
RBC # FLD: 13.2 % — SIGNIFICANT CHANGE UP (ref 11.5–14.5)
SODIUM SERPL-SCNC: 136 MMOL/L — SIGNIFICANT CHANGE UP (ref 135–146)
SP GR SPEC: 1 — LOW (ref 1.01–1.02)
UROBILINOGEN FLD QL: SIGNIFICANT CHANGE UP
WBC # BLD: 5.23 K/UL — SIGNIFICANT CHANGE UP (ref 4.8–10.8)
WBC # FLD AUTO: 5.23 K/UL — SIGNIFICANT CHANGE UP (ref 4.8–10.8)

## 2020-04-28 PROCEDURE — 99285 EMERGENCY DEPT VISIT HI MDM: CPT

## 2020-04-28 RX ORDER — ACETAMINOPHEN 500 MG
650 TABLET ORAL ONCE
Refills: 0 | Status: COMPLETED | OUTPATIENT
Start: 2020-04-28 | End: 2020-04-28

## 2020-04-28 RX ORDER — FAMOTIDINE 10 MG/ML
20 INJECTION INTRAVENOUS ONCE
Refills: 0 | Status: COMPLETED | OUTPATIENT
Start: 2020-04-28 | End: 2020-04-28

## 2020-04-28 RX ORDER — SODIUM CHLORIDE 9 MG/ML
1000 INJECTION, SOLUTION INTRAVENOUS ONCE
Refills: 0 | Status: COMPLETED | OUTPATIENT
Start: 2020-04-28 | End: 2020-04-28

## 2020-04-28 RX ORDER — ONDANSETRON 8 MG/1
4 TABLET, FILM COATED ORAL ONCE
Refills: 0 | Status: COMPLETED | OUTPATIENT
Start: 2020-04-28 | End: 2020-04-28

## 2020-04-28 RX ADMIN — SODIUM CHLORIDE 1000 MILLILITER(S): 9 INJECTION, SOLUTION INTRAVENOUS at 22:25

## 2020-04-28 RX ADMIN — ONDANSETRON 104 MILLIGRAM(S): 8 TABLET, FILM COATED ORAL at 22:25

## 2020-04-28 RX ADMIN — Medication 650 MILLIGRAM(S): at 22:50

## 2020-04-28 NOTE — ED ADULT TRIAGE NOTE - CHIEF COMPLAINT QUOTE
Pt is 6 months pregnant complaining of headache and vomitting x 3 days. Pt is 6 months pregnant complaining of headache and vomitting x 3 days. denies abdominal pain

## 2020-04-28 NOTE — ED PROVIDER NOTE - PHYSICAL EXAMINATION
CONST: Well appearing in NAD  EYES: Sclera and conjunctiva clear.   ENT: Oropharynx normal appearing, no erythema or exudates. No abscess or swelling. Uvula midline.   NECK: Non-tender, no meningeal signs. normal ROM. supple   CARD: Normal S1 S2; Normal rate and rhythm  RESP: Equal BS B/L, No wheezes, rhonchi or rales. No distress  GI: Gravid abdomen, soft, non-tender, non-distended. no cva tenderness.   MS: Normal ROM in all extremities. No midline spinal tenderness. no calf tenderness or swelling   SKIN: Warm, dry, no acute rashes. Good turgor  NEURO: A&Ox3, No focal deficits. Steady gait. Strength intact without sensory deficits

## 2020-04-28 NOTE — ED PROCEDURE NOTE - CPROC ED PHYSICIAN PRESENCE1
----- Message from Bernarda West, CDE sent at 5/1/2018  1:00 PM CDT -----  She will be stopping in this week for Freestyle meter download. If I'm not available and you do it, please download from 4/23 through the date you download.  Thanks!   I was present during the key portion of the procedure.

## 2020-04-28 NOTE — ED PROVIDER NOTE - CARE PROVIDER_API CALL
Yarelis Sales)  OBSGYN  Physicians  [address] [place] [address]  Phone: [phone]  Fax: [phone]  Follow Up Time:     Edilson Rosas)  Gastroenterology  [address] [place] [address]  Phone: [phone]  Fax: [phone]  Follow Up Time:

## 2020-04-28 NOTE — ED PROVIDER NOTE - NS ED ROS FT
Review of Systems:  	•	CONSTITUTIONAL: no fever, no diaphoresis, no chills  	•	SKIN: no rash  	•	EYES: no eye pain, no blurry vision  	•	ENT: no sore throat, no difficulty swallowing, no ear pain  	•	RESPIRATORY: no shortness of breath, no cough  	•	CARDIAC: no chest pain, no palpitations  	•	GI: +nausea, +vomiting. no diarrhea, no constipation, no abd pain   	•	GENITO-URINARY: no vaginal discharge/bleeding, no dysuria; no hematuria, no increased urinary frequency  	•	MUSCULOSKELETAL: no joint paint, no swelling, no redness  	•	NEUROLOGIC: +headache, no weakness, no numbness, no paresthesias, no syncope   	•	PSYCH: no anxiety, non suicidal, non homicidal, no hallucination, no depression

## 2020-04-28 NOTE — ED PROVIDER NOTE - CLINICAL SUMMARY MEDICAL DECISION MAKING FREE TEXT BOX
viral sx, N/V in pregnancy - IUP/FHR on bedside US, ur/labs nl, sx improved w/ivf, pepcid/zofran, pt reassessed and tolerated po - all results d/w pt & copies given, strict return precautions discussed, rec outpt ObGyn f/u as previously scheduled 5/7/20 1pm @ [facility] w/Dr. Sales viral sx, N/V in pregnancy, h/o H. pylori gastritis - IUP/FHR on bedside US, ur/labs nl, sx improved w/ivf, pepcid/zofran, pt reassessed and tolerated po - all results d/w pt & copies given, strict return precautions discussed, rec outpt ObGyn f/u as previously scheduled 5/7/20 1pm @ [facility] w/Dr. Sales, outpt GI f/u

## 2020-04-28 NOTE — ED PROVIDER NOTE - OBJECTIVE STATEMENT
24 year old female, , currently 6 months pregnant, who presents c/o diffuse headache and vomiting since yesterday. Patient reports 5 episodes of NB/NB vomiting, nausea. No fever, chills, chest pain, SOB, abd pain, vaginal bleeding/discharge, urinary symptoms. Patient reports mild cough. No recent new foods or recent travel. Patient had US performed x10 days ago.

## 2020-04-28 NOTE — ED ADULT NURSE NOTE - NSIMPLEMENTINTERV_GEN_ALL_ED
Implemented All Universal Safety Interventions:  [place] to call system. Call bell, personal items and telephone within reach. Instruct patient to call for assistance. Room bathroom lighting operational. Non-slip footwear when patient is off stretcher. Physically safe environment: no spills, clutter or unnecessary equipment. Stretcher in lowest position, wheels locked, appropriate side rails in place.

## 2020-04-28 NOTE — ED PROVIDER NOTE - PATIENT PORTAL LINK FT
You can access the FollowMyHealth Patient Portal offered by [facility] by registering at the following website: [email or web address]. By joining [email or web address]’s FollowMyHealth portal, you will also be able to view your health information using other applications (apps) compatible with our system.

## 2020-04-28 NOTE — ED PROVIDER NOTE - ATTENDING CONTRIBUTION TO CARE
24F  @ ega ~6 mos (first US was 1 week ago @ [facility]) h/o gastritis p/w viral sx x 2d. Subj fever, sore throat, dry cough and sev episodes nbnb vomiting, intermitt epigastric burning. No ha, neck pain or stiffness, cp/sob/carmen, cough, lower abd pain/cramping, vag bleeding or discharge, edema, rash. No sick contacts, recent travel or abx. Gyn [facility]. Next appt  1pm.    PE:  nad  skin warm, dry  ncat  neck supple  tachy 110s reg rhythm nl s1s2 no mrg  ctab no wrr  abd gravid soft ntnd no palpable masses no rgr  back non-tender no cvat  ext no cce dpi  neuro aaox3 grossly nf exam 24F  @ ega ~6 mos (first US was 1 week ago @ [facility]) h/o gastritis p/w viral sx x 2d. Subj fever, ha, sore throat, dry cough and sev episodes nbnb vomiting, intermitt epigastric burning. No neck pain or stiffness, cp/sob/carmen, cough, lower abd pain/cramping, vag bleeding or discharge, edema, rash. No sick contacts, recent travel or abx. Gyn [facility]. Next appt  1pm.    PE:  nad  skin warm, dry  ncat  neck supple  tachy 110s reg rhythm nl s1s2 no mrg  ctab no wrr  abd gravid soft ntnd no palpable masses no rgr  back non-tender no cvat  ext no cce dpi  neuro aaox3 grossly nf exam 24F  @ ega ~6 mos (first US was 1 week ago @ [facility]) h/o H. pylori gastritis s/p 2nd course of abx thx Feb-2020 (stopped once found out pregnant last week) p/w viral sx x 2d. Subj fever, ha, sore throat, dry cough and sev episodes nbnb vomiting, intermitt epigastric burning. No neck pain or stiffness, cp/sob/carmen, cough, lower abd pain/cramping, vag bleeding or discharge, edema, rash. No sick contacts, recent travel or abx. ObGyn [facility]. Next appt  1pm.    PE:  nad  skin warm, dry  ncat  neck supple  tachy 110s reg rhythm nl s1s2 no mrg  ctab no wrr  abd gravid soft ntnd no palpable masses no rgr  back non-tender no cvat  ext no cce dpi  neuro aaox3 grossly nf exam

## 2020-04-28 NOTE — ED ADULT NURSE NOTE - OBJECTIVE STATEMENT
Pt c/o headache with fever cough and sore throat also c/o nausea and vomiting. denies sob or vaginal bleeding

## 2020-04-28 NOTE — ED PROVIDER NOTE - PROGRESS NOTE DETAILS
Bedside US performed, . Pt sts feeling much better after meds. Tolerating po intake well in ED. no further complaints. Pt instructed to follow up with OB/GI. Strict return precautions given.

## 2020-04-29 VITALS
DIASTOLIC BLOOD PRESSURE: 50 MMHG | RESPIRATION RATE: 18 BRPM | HEART RATE: 85 BPM | OXYGEN SATURATION: 98 % | TEMPERATURE: 97 F | SYSTOLIC BLOOD PRESSURE: 96 MMHG

## 2020-04-29 LAB
BACTERIA # UR AUTO: NEGATIVE — SIGNIFICANT CHANGE UP
EPI CELLS # UR: 15 /HPF — HIGH (ref 0–5)
HYALINE CASTS # UR AUTO: 2 /LPF — SIGNIFICANT CHANGE UP (ref 0–7)
RBC CASTS # UR COMP ASSIST: 0 /HPF — SIGNIFICANT CHANGE UP (ref 0–4)
WBC UR QL: 3 /HPF — SIGNIFICANT CHANGE UP (ref 0–5)

## 2020-04-29 RX ADMIN — FAMOTIDINE 20 MILLIGRAM(S): 10 INJECTION INTRAVENOUS at 00:01

## 2020-04-29 NOTE — ED ADULT NURSE REASSESSMENT NOTE - NS ED NURSE REASSESS COMMENT FT1
Pt assessed. Pt is awake and alert. Pt tolerating PO intake/fluids. Pt denies N/V. Pt denies pain or discomfort. Pt is not in any distress. WIll continue to monitor.

## 2020-04-30 LAB
CULTURE RESULTS: SIGNIFICANT CHANGE UP
SPECIMEN SOURCE: SIGNIFICANT CHANGE UP

## 2020-05-04 ENCOUNTER — OUTPATIENT (OUTPATIENT)
Dept: OUTPATIENT SERVICES | Facility: HOSPITAL | Age: 25
LOS: 1 days | Discharge: HOME | End: 2020-05-04

## 2020-05-05 LAB
GLUCOSE 1H P 100 G GLC PO SERPL-MCNC: 169 MG/DL
GLUCOSE 2H P CHAL SERPL-MCNC: 119 MG/DL
GLUCOSE 3H P CHAL SERPL-MCNC: 123 MG/DL
GLUCOSE BS SERPL-MCNC: 72 MG/DL

## 2020-05-07 ENCOUNTER — NON-APPOINTMENT (OUTPATIENT)
Age: 25
End: 2020-05-07

## 2020-05-07 ENCOUNTER — OUTPATIENT (OUTPATIENT)
Dept: OUTPATIENT SERVICES | Facility: HOSPITAL | Age: 25
LOS: 1 days | Discharge: HOME | End: 2020-05-07

## 2020-05-07 ENCOUNTER — RESULT CHARGE (OUTPATIENT)
Age: 25
End: 2020-05-07

## 2020-05-07 ENCOUNTER — ASOB RESULT (OUTPATIENT)
Age: 25
End: 2020-05-07

## 2020-05-07 ENCOUNTER — APPOINTMENT (OUTPATIENT)
Dept: ANTEPARTUM | Facility: CLINIC | Age: 25
End: 2020-05-07
Payer: MEDICAID

## 2020-05-07 ENCOUNTER — APPOINTMENT (OUTPATIENT)
Dept: OBGYN | Facility: CLINIC | Age: 25
End: 2020-05-07
Payer: MEDICAID

## 2020-05-07 VITALS — DIASTOLIC BLOOD PRESSURE: 60 MMHG | SYSTOLIC BLOOD PRESSURE: 100 MMHG | WEIGHT: 111 LBS

## 2020-05-07 DIAGNOSIS — Z34.92 ENCOUNTER FOR SUPERVISION OF NORMAL PREGNANCY, UNSPECIFIED, SECOND TRIMESTER: ICD-10-CM

## 2020-05-07 PROCEDURE — 99213 OFFICE O/P EST LOW 20 MIN: CPT

## 2020-05-07 PROCEDURE — 76815 OB US LIMITED FETUS(S): CPT | Mod: 26

## 2020-05-08 LAB
ABO + RH PNL BLD: NORMAL
BILIRUB UR QL STRIP: NEGATIVE
BLD GP AB SCN SERPL QL: NORMAL
CLARITY UR: CLEAR
COLLECTION METHOD: NORMAL
GLUCOSE UR-MCNC: NEGATIVE
HCG UR QL: NEGATIVE EU/DL
HGB UR QL STRIP.AUTO: NEGATIVE
KETONES UR-MCNC: NEGATIVE
LEUKOCYTE ESTERASE UR QL STRIP: NEGATIVE
NITRITE UR QL STRIP: NEGATIVE
PH UR STRIP: 6
PROT UR STRIP-MCNC: NEGATIVE
SP GR UR STRIP: 1.02

## 2020-05-12 ENCOUNTER — APPOINTMENT (OUTPATIENT)
Dept: OBGYN | Facility: CLINIC | Age: 25
End: 2020-05-12

## 2020-05-13 LAB
BACTERIA UR CULT: NORMAL
BASOPHILS # BLD AUTO: 0.01 K/UL
BASOPHILS NFR BLD AUTO: 0.2 %
EOSINOPHIL # BLD AUTO: 0.02 K/UL
EOSINOPHIL NFR BLD AUTO: 0.4 %
GLUCOSE 1H P 50 G GLC PO SERPL-MCNC: 141 MG/DL
HCT VFR BLD CALC: 36.1 %
HGB BLD-MCNC: 11.6 G/DL
HIV1+2 AB SPEC QL IA.RAPID: NONREACTIVE
IMM GRANULOCYTES NFR BLD AUTO: 0.4 %
LEAD BLD-MCNC: 1 UG/DL
LYMPHOCYTES # BLD AUTO: 0.91 K/UL
LYMPHOCYTES NFR BLD AUTO: 19.3 %
MAN DIFF?: NORMAL
MCHC RBC-ENTMCNC: 29.8 PG
MCHC RBC-ENTMCNC: 32.1 G/DL
MCV RBC AUTO: 92.8 FL
MONOCYTES # BLD AUTO: 0.26 K/UL
MONOCYTES NFR BLD AUTO: 5.5 %
NEUTROPHILS # BLD AUTO: 3.5 K/UL
NEUTROPHILS NFR BLD AUTO: 74.2 %
PLATELET # BLD AUTO: 216 K/UL
RBC # BLD: 3.89 M/UL
RBC # FLD: 13.9 %
RUBV IGG FLD-ACNC: 3.2 INDEX
RUBV IGG SER-IMP: POSITIVE
T PALLIDUM AB SER QL IA: NEGATIVE
VZV AB TITR SER: POSITIVE
VZV IGG SER IF-ACNC: 169.5 INDEX
WBC # FLD AUTO: 4.72 K/UL

## 2020-06-04 ENCOUNTER — NON-APPOINTMENT (OUTPATIENT)
Age: 25
End: 2020-06-04

## 2020-06-04 ENCOUNTER — OUTPATIENT (OUTPATIENT)
Dept: OUTPATIENT SERVICES | Facility: HOSPITAL | Age: 25
LOS: 1 days | Discharge: HOME | End: 2020-06-04

## 2020-06-04 ENCOUNTER — APPOINTMENT (OUTPATIENT)
Dept: OBGYN | Facility: CLINIC | Age: 25
End: 2020-06-04
Payer: MEDICAID

## 2020-06-04 ENCOUNTER — RESULT CHARGE (OUTPATIENT)
Age: 25
End: 2020-06-04

## 2020-06-04 VITALS
DIASTOLIC BLOOD PRESSURE: 52 MMHG | BODY MASS INDEX: 27.31 KG/M2 | WEIGHT: 118 LBS | HEIGHT: 55 IN | SYSTOLIC BLOOD PRESSURE: 90 MMHG

## 2020-06-04 DIAGNOSIS — Z3A.32 32 WEEKS GESTATION OF PREGNANCY: ICD-10-CM

## 2020-06-04 DIAGNOSIS — Z36.89 ENCOUNTER FOR OTHER SPECIFIED ANTENATAL SCREENING: ICD-10-CM

## 2020-06-04 DIAGNOSIS — Z34.83 ENCOUNTER FOR SUPERVISION OF OTHER NORMAL PREGNANCY, THIRD TRIMESTER: ICD-10-CM

## 2020-06-04 LAB
BILIRUB UR QL STRIP: NORMAL
CLARITY UR: CLEAR
COLLECTION METHOD: NORMAL
GLUCOSE UR-MCNC: NORMAL
HCG UR QL: 0.2 EU/DL
HGB UR QL STRIP.AUTO: NORMAL
KETONES UR-MCNC: NORMAL
LEUKOCYTE ESTERASE UR QL STRIP: NORMAL
NITRITE UR QL STRIP: NORMAL
PH UR STRIP: 6
PROT UR STRIP-MCNC: NORMAL
SP GR UR STRIP: 1.02

## 2020-06-04 PROCEDURE — 99213 OFFICE O/P EST LOW 20 MIN: CPT

## 2020-06-18 ENCOUNTER — RESULT CHARGE (OUTPATIENT)
Age: 25
End: 2020-06-18

## 2020-06-18 ENCOUNTER — APPOINTMENT (OUTPATIENT)
Dept: ANTEPARTUM | Facility: CLINIC | Age: 25
End: 2020-06-18
Payer: MEDICAID

## 2020-06-18 ENCOUNTER — OUTPATIENT (OUTPATIENT)
Dept: OUTPATIENT SERVICES | Facility: HOSPITAL | Age: 25
LOS: 1 days | Discharge: HOME | End: 2020-06-18

## 2020-06-18 ENCOUNTER — NON-APPOINTMENT (OUTPATIENT)
Age: 25
End: 2020-06-18

## 2020-06-18 ENCOUNTER — ASOB RESULT (OUTPATIENT)
Age: 25
End: 2020-06-18

## 2020-06-18 ENCOUNTER — APPOINTMENT (OUTPATIENT)
Dept: OBGYN | Facility: CLINIC | Age: 25
End: 2020-06-18
Payer: MEDICAID

## 2020-06-18 VITALS
DIASTOLIC BLOOD PRESSURE: 58 MMHG | HEIGHT: 55 IN | BODY MASS INDEX: 28.23 KG/M2 | SYSTOLIC BLOOD PRESSURE: 114 MMHG | WEIGHT: 122 LBS

## 2020-06-18 DIAGNOSIS — Z34.83 ENCOUNTER FOR SUPERVISION OF OTHER NORMAL PREGNANCY, THIRD TRIMESTER: ICD-10-CM

## 2020-06-18 LAB
BILIRUB UR QL STRIP: NORMAL
CLARITY UR: CLEAR
COLLECTION METHOD: NORMAL
GLUCOSE UR-MCNC: NORMAL
HCG UR QL: 0.2 EU/DL
HGB UR QL STRIP.AUTO: NORMAL
KETONES UR-MCNC: NORMAL
LEUKOCYTE ESTERASE UR QL STRIP: NORMAL
NITRITE UR QL STRIP: NORMAL
PH UR STRIP: 6
PROT UR STRIP-MCNC: NORMAL
SP GR UR STRIP: 1.01

## 2020-06-18 PROCEDURE — 76816 OB US FOLLOW-UP PER FETUS: CPT | Mod: 26

## 2020-06-18 PROCEDURE — 99213 OFFICE O/P EST LOW 20 MIN: CPT

## 2020-06-29 ENCOUNTER — EMERGENCY (EMERGENCY)
Facility: HOSPITAL | Age: 25
LOS: 0 days | Discharge: HOME | End: 2020-06-29
Attending: EMERGENCY MEDICINE | Admitting: EMERGENCY MEDICINE
Payer: MEDICAID

## 2020-06-29 VITALS
DIASTOLIC BLOOD PRESSURE: 56 MMHG | SYSTOLIC BLOOD PRESSURE: 96 MMHG | OXYGEN SATURATION: 99 % | TEMPERATURE: 99 F | HEART RATE: 91 BPM

## 2020-06-29 VITALS
HEART RATE: 110 BPM | DIASTOLIC BLOOD PRESSURE: 61 MMHG | TEMPERATURE: 98 F | RESPIRATION RATE: 18 BRPM | SYSTOLIC BLOOD PRESSURE: 111 MMHG | OXYGEN SATURATION: 98 %

## 2020-06-29 DIAGNOSIS — R20.0 ANESTHESIA OF SKIN: ICD-10-CM

## 2020-06-29 DIAGNOSIS — R00.0 TACHYCARDIA, UNSPECIFIED: ICD-10-CM

## 2020-06-29 DIAGNOSIS — G51.0 BELL'S PALSY: ICD-10-CM

## 2020-06-29 DIAGNOSIS — O99.353 DISEASES OF THE NERVOUS SYSTEM COMPLICATING PREGNANCY, THIRD TRIMESTER: ICD-10-CM

## 2020-06-29 PROCEDURE — 99284 EMERGENCY DEPT VISIT MOD MDM: CPT

## 2020-06-29 PROCEDURE — 76819 FETAL BIOPHYS PROFIL W/O NST: CPT | Mod: 26

## 2020-06-29 PROCEDURE — 99282 EMERGENCY DEPT VISIT SF MDM: CPT | Mod: 25

## 2020-06-29 RX ORDER — VALACYCLOVIR 500 MG/1
1 TABLET, FILM COATED ORAL
Qty: 21 | Refills: 0
Start: 2020-06-29 | End: 2020-07-05

## 2020-06-29 NOTE — CHART NOTE - NSCHARTNOTEFT_GEN_A_CORE
Biophysical Profile    Amniotic fluid: pocket >2cm two planes  Fetal movement: observed  Fetal Breathing: observed  Fetal Tone: observed    Score: 8/8

## 2020-06-29 NOTE — ED PROVIDER NOTE - PROGRESS NOTE DETAILS
TC: 25 yo F with PMHx of gastritis,  currently 8 mo pregnant who presents with L facial droop/numbness x3 days with involvement of forehead c/w bells palsy. No other focal neuro deficits. No CVA risk factors. FHR 130s-140s. Placed call for OBGYN. TC: Spoke with OBGYN who will see pt in L&D. TC: Spoke with OBGYN who will TC: Spoke with OBGYN who will come see pt. TC: Seen by OBGYN who cleared pt for d/c home, states ok to send rx for prednisone and valacyclovir to pharmacy. Strict ED return precautions given. Pt verbalized understanding and was agreeable with plan.

## 2020-06-29 NOTE — CONSULT NOTE ADULT - ASSESSMENT
25yo  @34wks GA, with Bell's palsy, no obstetrical issues, BPP 8/8, maternal and fetal status reassuring    -no acute OB intervention  -recommended follow-up with OB during appointment on   -ED recommends Prednisone 40mg x 5 days and Valtrex-- both are okay for pregnancy  -dispo per ED    Dr. Welsh and Dr. Yanez aware      INCOMPLETE 23yo  @34wks GA, with Bell's palsy, no obstetrical issues, BPP 8/8, maternal and fetal status are reassuring    -no acute OB intervention  -recommended follow-up with OB during appointment on   -ED recommends Prednisone 40mg x 5 days and Valtrex-- both are okay for pregnancy  -dispo per ED    Dr. Welsh and Dr. Yanez aware 23yo  @34wks GA, with Bell's palsy, no obstetrical issues, BPP , fetal status is reassuring    -no acute OB intervention  -recommended follow-up with OB during appointment on   -ED recommends Prednisone 40mg x 5 days and Valtrex-- both are okay for pregnancy  -dispo per ED    Dr. Welsh and Dr. Yanez aware

## 2020-06-29 NOTE — ED PROVIDER NOTE - CLINICAL SUMMARY MEDICAL DECISION MAKING FREE TEXT BOX
Pt presented to ED with Bell's palsy for several days. Rest of neuro exam wnl. FHR wnl, evaluated by OBGYN and cleared. Will start on valtrex/steroids, as per OB team. Results reviewed and discussed with pt and printed for patient. Anticipatory guidance given including close outpatient followup. Strict return precautions given. Pt verbalizes understanding of and agrees with plan.

## 2020-06-29 NOTE — ED PROVIDER NOTE - NS ED MD DISPO DISCHARGE CCDA
ID Progress Note  2020    Subjective:     Doing ok, seems to be feeling stronger    Objective:     Antibiotics:  1. Levaquin  2. Cefepime   3. Rifampin   4. Fluconazole  5. Vancomycin    6. Cefazolin   7. Zosyn       Vitals:   Visit Vitals  BP 91/72 (BP 1 Location: Left arm, BP Patient Position: At rest)   Pulse 87   Temp 97.7 °F (36.5 °C)   Resp 20   Ht 6' 2\" (1.88 m)   Wt 76.2 kg (167 lb 15.9 oz)   SpO2 100%   BMI 21.57 kg/m²        Tmax:  Temp (24hrs), Av °F (36.7 °C), Min:97.4 °F (36.3 °C), Max:98.4 °F (36.9 °C)      Exam:  Lungs:  clear to auscultation bilaterally  Heart:  regular rate and rhythm  Abdomen:  soft, non-tender. Bowel sounds normal. No masses,  no organomegaly  Urine in neal is grossly clear      Labs:      Recent Labs     20  0429 20  0617 20  0351   WBC 6.2 6.1 5.6   HGB 8.9* 8.8* 8.5*   * 131* 124*   BUN 21* 20 19   CREA 1.09 1.03 1.02   SGOT 19 21 18    116 110   TBILI 0.7 0.6 0.6       Cultures:     No results found for: SDES  Lab Results   Component Value Date/Time    Culture result: LIGHT NORMAL RESPIRATORY TIAN 2019 04:18 PM    Culture result: (A) 2019 03:24 PM     PSEUDOMONAS AERUGINOSA ISOLATED FROM 2 OF 4 BOTTLES DRAWN, EACH FROM A DIFFERENT SITE. .. RFA AND LEFT WRIST    Culture result: (A) 2019 03:24 PM     PRELIMINARY REPORT OF GRAM NEGATIVE RODS GROWING IN 1 OF 4 BOTTLES DRAWN CALLED TO AND READ BACK BY Benito Gale RN AT 8834 ON 20 RB    Culture result: (A) 2019 03:24 PM     PRELIMINARY REPORT OF GRAM NEGATIVE RODS GROWING IN 2ND OF 4 BOTTLES DRAWN (DIFFERENT SITE=L WRIST) CALLED TO AND READ BACK BY Benito Gale RN AT 15:51 ON 20 BY [facility]. Culture result: REMAINING BOTTLE(S) HAS/HAVE NO GROWTH SO FAR 2019 03:24 PM       Radiology:     Line/Insert Date:           Assessment:     1.  UTI--Serratia (2 biotypes) from culture and small amount of Enterococcus--now with Pseudomonas from culture of urine and blood  2. CHF/cardiomyopathy  3. ?aspiration event(s)  4. Renal insufficiency  5. Respiratory difficulties    Objective:     1.  Continue current therapy      Vikash Andino MD Patient/Caregiver provided printed discharge information.

## 2020-06-29 NOTE — ED ADULT NURSE NOTE - NSIMPLEMENTINTERV_GEN_ALL_ED
Implemented All Universal Safety Interventions:  [place] to call system. Call bell, personal items and telephone within reach. Instruct patient to call for assistance. Room bathroom lighting operational. Non-slip footwear when patient is off stretcher. Physically safe environment: no spills, clutter or unnecessary equipment. Stretcher in lowest position, wheels locked, appropriate side rails in place.

## 2020-06-29 NOTE — ED PROVIDER NOTE - PHYSICAL EXAMINATION
CONSTITUTIONAL: well developed, well nourished, in no acute distress, speaking in full sentences, nontoxic appearing  SKIN: warm, dry, no rash  HEAD: normocephalic, atraumatic  EYES: PERRL at 3mm, EOMI, no conjunctival erythema, no nystagmus  ENT: patent airway, moist mucous membranes, no tongue deviation  NECK: supple, no masses, full flexion/extension without pain  CV:  mildly tachycardic rate, regular rhythm, 2+ radial pulses bilaterally  RESP: no wheezes, no rales, no rhonchi, normal work of breathing  ABD: soft, nontender, nondistended, no rebound, no guarding  MSK: normal ROM, no cyanosis, no edema  NEURO: alert, oriented, L facial droop with involvement of forehead, decreased sensation on L face, 5/5 motor strength in all extremities, no pronator drift, normal gait  PSYCH: cooperative, appropriate CONSTITUTIONAL: well developed, well nourished, in no acute distress, speaking in full sentences, nontoxic appearing  SKIN: warm, dry, no rash  HEAD: normocephalic, atraumatic  EYES: PERRL at 3mm, EOMI, no conjunctival erythema, no nystagmus  ENT: patent airway, moist mucous membranes, no tongue deviation  NECK: supple, no masses, full flexion/extension without pain  CV:  mildly tachycardic rate, regular rhythm, 2+ radial pulses bilaterally  RESP: no wheezes, no rales, no rhonchi, normal work of breathing  ABD: soft, nontender, gravid uterus, no rebound, no guarding  MSK: normal ROM, no cyanosis, no edema  NEURO: alert, oriented, L facial droop with involvement of forehead, decreased sensation on L face, 5/5 motor strength in all extremities, no pronator drift, normal gait  PSYCH: cooperative, appropriate

## 2020-06-29 NOTE — ED PROVIDER NOTE - OBJECTIVE STATEMENT
25 yo F with PMHx of gastritis,  currently 8 mo pregnant who presents with L facial numbness and L facial droop x 3 days. No alleviating/aggravating factors. No headache, blurry vision, slurred speech, unilateral weakness, numbness/tingling in extremities, cp, sob, rash. No hx of CVA or head trauma. +fetal movement. No abd pain, vaginal bleeding/discharge, leakage of fluids.     OBGYN: Dr. Sales 25 yo F with PMHx of gastritis,  currently 8 mo pregnant who presents with L facial numbness and L facial droop x 3 days. No alleviating/aggravating factors. No headache, blurry vision, slurred speech, unilateral weakness, numbness/tingling in extremities, cp, sob, rash, fever, chills. No hx of CVA or head trauma. +fetal movement. No abd pain, vaginal bleeding/discharge, leakage of fluids.     OBGYN: Dr. Sales

## 2020-06-29 NOTE — ED PROVIDER NOTE - CARE PROVIDER_API CALL
Yarelis Sales)  OBSGYN  Physicians  [address] [place] [address]  Phone: [phone]  Fax: [phone]  Follow Up Time:

## 2020-06-29 NOTE — ED PROVIDER NOTE - NS ED ROS FT
GEN:  no fever, no chills  NEURO:  no headache, + facial droop/numbness  ENT: no sore throat, no runny nose  CV:  no chest pain, no palpitations  RESP:  no sob, no cough  GI:  no nausea, no vomiting, no abdominal pain  :  no dysuria, no urinary frequency, no hematuria  MSK:  no joint pain, no edema  SKIN:  no rash, no bruising  HEME: no hematochezia, no melena

## 2020-06-29 NOTE — CONSULT NOTE ADULT - SUBJECTIVE AND OBJECTIVE BOX
23yo  @34w0d, LEN 8/10/2020 by second trimester sonogram, presents to ED with complaints of facial numbness and weakness. She reports the symptoms started 3 days ago, mostly on right side of her face, progressively affecting the left. She also reports loss of taste on one side of the tongue and involuntary eye closure. She denies similar previous episode. Also denies fever, chills, n/v, CP, SOB, weakness, numbness and tingling of extremities, diarrhea/constipation, sick contacts, recent cold-like symptoms, VB, LOF, contractions. Reports good FM. Last intercourse 1 week ago, last bm this am. Had elevated GCT with normal GTT. Denies any issues this pregnancy.    Ob/Gyn History:   - FT  x 2,  largest 8lb6oz, no complications                     Denies history of ovarian cysts, uterine fibroids, abnormal paps, or STIs  Last Pap Smear - 2019, NILM      Denies the following: constitutional symptoms, visual symptoms, cardiovascular symptoms, respiratory symptoms, GI symptoms, musculoskeletal symptoms, skin symptoms, neurologic symptoms, hematologic symptoms, allergic symptoms, psychiatric symptoms  Except any pertinent positives listed.     Home Medications:  pantoprazole 20 mg oral delayed release tablet: 1 tab(s) orally once a day (2019 14:21)    No Known Allergies      PAST MEDICAL & SURGICAL HISTORY:  Gastritis  No significant past surgical history      FAMILY HISTORY: Non-contributory      SOCIAL HISTORY: Denies cigarette use, alcohol use, or illicit drug use    Vital Signs Last 24 Hrs  T(F): 98.6 (2020 15:36), Max: 98.6 (2020 15:36)  HR: 91 (2020 15:36) (91 - 110)  BP: 96/56 (2020 15:36) (96/56 - 111/61)  RR: 18 (2020 14:03) (18 - 18)    General Appearance - AAOx3, NAD  Heart - S1S2 regular rate and rhythm  Lung - CTA Bilaterally  Neuro - 5/5 strength bilaterally on LE and UE, normal sensation of all extremities  Abdomen - Soft, nontender, nondistended, no rebound, no rigidity, no guarding, bowel sounds present, gravid  Pelvic exam- deferred  Bedside sonogram - cephalic, post placenta, MVP 4.59cm, FHR 145bpm, BPP 8/8    LABS:    N/A 25yo  @34w0d, LEN 8/10/2020 by second trimester sonogram, presents to ED with complaints of facial numbness and weakness. She reports the symptoms started 3 days ago, mostly on right side of her face, progressively affecting the left. She also reports loss of taste on one side of the tongue and involuntary eye closure. She denies similar previous episode. Also denies fever, chills, n/v, CP, SOB, weakness, numbness and tingling of extremities, diarrhea/constipation, sick contacts, recent cold-like symptoms, VB, LOF, contractions. Reports good FM. Last intercourse 1 week ago, last bm this am. Had elevated GCT with normal GTT. Denies any issues this pregnancy.    Ob/Gyn History:   - FT  x 2,  largest 8lb6oz, no complications                     Denies history of ovarian cysts, uterine fibroids, abnormal paps, or STIs  Last Pap Smear - 2019, NILM      Denies the following: constitutional symptoms, visual symptoms, cardiovascular symptoms, respiratory symptoms, GI symptoms, musculoskeletal symptoms, skin symptoms, neurologic symptoms, hematologic symptoms, allergic symptoms, psychiatric symptoms  Except any pertinent positives listed.     Home Medications:  pantoprazole 20 mg oral delayed release tablet: 1 tab(s) orally once a day (2019 14:21)    No Known Allergies      PAST MEDICAL & SURGICAL HISTORY:  Gastritis  No significant past surgical history      FAMILY HISTORY: Non-contributory      SOCIAL HISTORY: Denies cigarette use, alcohol use, or illicit drug use    Vital Signs Last 24 Hrs  T(F): 98.6 (2020 15:36), Max: 98.6 (2020 15:36)  HR: 91 (2020 15:36) (91 - 110)  BP: 96/56 (2020 15:36) (96/56 - 111/61)  RR: 18 (2020 14:03) (18 - 18)    General Appearance - AAOx3, NAD  Heart - S1S2 regular rate and rhythm  Lung - CTA Bilaterally  Neuro - 5/5 strength bilaterally on LE and UE, normal sensation of all extremities  Abdomen - Soft, nontender, nondistended, no rebound, no rigidity, no guarding, bowel sounds present, gravid  Pelvic exam- deferred  Bedside sonogram - cephalic, post placenta, MVP 4.59cm, FHR 145bpm, BPP 8/8

## 2020-06-29 NOTE — ED PROVIDER NOTE - ATTENDING CONTRIBUTION TO CARE
I personally evaluated the patient. I reviewed the Resident’s or Physician Assistant’s note (as assigned above), and agree with the findings and plan except as documented in my note.    Pt is a 23 y/o female currently 8 weeks pregnant presents to ED for right facial weakness for 3 days, moderate, constant, no change since onset. + tearing from eye. No headache, neck pain, chest pain, sOB, abd pain, n/v/d, vaginal bleeding.    Constitutional: Well developed, well nourished. NAD.  Head: Normocephalic, atraumatic.  Eyes: PERRL. EOMI.  ENT: No nasal discharge. Mucous membranes moist.  Neck: Supple. Painless ROM.  Cardiovascular: Normal S1, S2. Regular rate and rhythm. No murmurs, rubs, or gallops.  Pulmonary: Normal respiratory rate and effort. Lungs clear to auscultation bilaterally. No wheezing, rales, or rhonchi.  Abdominal: Soft. + Gravid. Nontender. No rebound, guarding, rigidity.  Extremities. Pelvis stable. No lower extremity edema, symmetric calves.  Skin: No rashes, cyanosis.  Neuro: AAOx3. CN II-XII grossly intact, except right facial droop for V1-V3, no slurred speech. Strength 5/5 in upper and lower extremities. Sensation intact in all extremities. FNF testing normal. No pronator drift. Negative Rhombergs test. Normal gait.   Psych: Normal mood. Normal affect.

## 2020-06-29 NOTE — ED PROVIDER NOTE - PATIENT PORTAL LINK FT
You can access the FollowMyHealth Patient Portal offered by [facility] by registering at the following website: [email or web address]. By joining [employer]’s FollowMyHealth portal, you will also be able to view your health information using other applications (apps) compatible with our system.

## 2020-07-02 ENCOUNTER — NON-APPOINTMENT (OUTPATIENT)
Age: 25
End: 2020-07-02

## 2020-07-02 ENCOUNTER — RESULT CHARGE (OUTPATIENT)
Age: 25
End: 2020-07-02

## 2020-07-02 ENCOUNTER — OUTPATIENT (OUTPATIENT)
Dept: OUTPATIENT SERVICES | Facility: HOSPITAL | Age: 25
LOS: 1 days | Discharge: HOME | End: 2020-07-02

## 2020-07-02 ENCOUNTER — APPOINTMENT (OUTPATIENT)
Dept: OBGYN | Facility: CLINIC | Age: 25
End: 2020-07-02
Payer: MEDICAID

## 2020-07-02 VITALS
BODY MASS INDEX: 28.7 KG/M2 | SYSTOLIC BLOOD PRESSURE: 104 MMHG | HEIGHT: 55 IN | DIASTOLIC BLOOD PRESSURE: 60 MMHG | WEIGHT: 124 LBS

## 2020-07-02 DIAGNOSIS — Z34.83 ENCOUNTER FOR SUPERVISION OF OTHER NORMAL PREGNANCY, THIRD TRIMESTER: ICD-10-CM

## 2020-07-02 PROCEDURE — 99213 OFFICE O/P EST LOW 20 MIN: CPT

## 2020-07-06 LAB
BILIRUB UR QL STRIP: NEGATIVE
CLARITY UR: CLEAR
COLLECTION METHOD: NORMAL
GLUCOSE UR-MCNC: NEGATIVE
HCG UR QL: NEGATIVE EU/DL
HGB UR QL STRIP.AUTO: NEGATIVE
KETONES UR-MCNC: NEGATIVE
LEUKOCYTE ESTERASE UR QL STRIP: NEGATIVE
NITRITE UR QL STRIP: NEGATIVE
PH UR STRIP: 6
PROT UR STRIP-MCNC: NEGATIVE
SP GR UR STRIP: 1.01

## 2020-07-10 LAB
BASOPHILS # BLD AUTO: 0.02 K/UL
BASOPHILS NFR BLD AUTO: 0.2 %
EOSINOPHIL # BLD AUTO: 0.05 K/UL
EOSINOPHIL NFR BLD AUTO: 0.6 %
HCT VFR BLD CALC: 37.6 %
HGB BLD-MCNC: 12.2 G/DL
HIV1+2 AB SPEC QL IA.RAPID: NONREACTIVE
IMM GRANULOCYTES NFR BLD AUTO: 1.4 %
LYMPHOCYTES # BLD AUTO: 1.66 K/UL
LYMPHOCYTES NFR BLD AUTO: 20.6 %
MAN DIFF?: NORMAL
MCHC RBC-ENTMCNC: 29.8 PG
MCHC RBC-ENTMCNC: 32.4 G/DL
MCV RBC AUTO: 91.9 FL
MONOCYTES # BLD AUTO: 0.38 K/UL
MONOCYTES NFR BLD AUTO: 4.7 %
NEUTROPHILS # BLD AUTO: 5.85 K/UL
NEUTROPHILS NFR BLD AUTO: 72.5 %
PLATELET # BLD AUTO: 191 K/UL
RBC # BLD: 4.09 M/UL
RBC # FLD: 14.6 %
T PALLIDUM AB SER QL IA: NEGATIVE
WBC # FLD AUTO: 8.07 K/UL

## 2020-07-16 ENCOUNTER — OUTPATIENT (OUTPATIENT)
Dept: OUTPATIENT SERVICES | Facility: HOSPITAL | Age: 25
LOS: 1 days | Discharge: HOME | End: 2020-07-16

## 2020-07-16 ENCOUNTER — APPOINTMENT (OUTPATIENT)
Dept: OBGYN | Facility: CLINIC | Age: 25
End: 2020-07-16
Payer: MEDICAID

## 2020-07-16 ENCOUNTER — RESULT CHARGE (OUTPATIENT)
Age: 25
End: 2020-07-16

## 2020-07-16 ENCOUNTER — NON-APPOINTMENT (OUTPATIENT)
Age: 25
End: 2020-07-16

## 2020-07-16 VITALS
HEIGHT: 55 IN | DIASTOLIC BLOOD PRESSURE: 66 MMHG | BODY MASS INDEX: 28.94 KG/M2 | WEIGHT: 125.06 LBS | SYSTOLIC BLOOD PRESSURE: 110 MMHG

## 2020-07-16 DIAGNOSIS — Z34.90 ENCOUNTER FOR SUPERVISION OF NORMAL PREGNANCY, UNSPECIFIED, UNSPECIFIED TRIMESTER: ICD-10-CM

## 2020-07-16 PROCEDURE — 99213 OFFICE O/P EST LOW 20 MIN: CPT

## 2020-07-17 ENCOUNTER — LABORATORY RESULT (OUTPATIENT)
Age: 25
End: 2020-07-17

## 2020-07-17 LAB
BILIRUB UR QL STRIP: NORMAL
CLARITY UR: CLEAR
COLLECTION METHOD: NORMAL
GLUCOSE UR-MCNC: NORMAL
HCG UR QL: 0.2 EU/DL
HGB UR QL STRIP.AUTO: NORMAL
KETONES UR-MCNC: NORMAL
LEUKOCYTE ESTERASE UR QL STRIP: NORMAL
NITRITE UR QL STRIP: NORMAL
PH UR STRIP: 6.5
PROT UR STRIP-MCNC: NORMAL
SP GR UR STRIP: 1.02

## 2020-07-22 LAB
C TRACH RRNA SPEC QL NAA+PROBE: NOT DETECTED
N GONORRHOEA RRNA SPEC QL NAA+PROBE: NOT DETECTED
SOURCE AMPLIFICATION: NORMAL

## 2020-07-23 ENCOUNTER — EMERGENCY (EMERGENCY)
Facility: HOSPITAL | Age: 25
LOS: 0 days | Discharge: HOME | End: 2020-07-23
Attending: EMERGENCY MEDICINE | Admitting: EMERGENCY MEDICINE
Payer: MEDICAID

## 2020-07-23 ENCOUNTER — NON-APPOINTMENT (OUTPATIENT)
Age: 25
End: 2020-07-23

## 2020-07-23 ENCOUNTER — APPOINTMENT (OUTPATIENT)
Dept: OBGYN | Facility: CLINIC | Age: 25
End: 2020-07-23
Payer: MEDICAID

## 2020-07-23 ENCOUNTER — RESULT CHARGE (OUTPATIENT)
Age: 25
End: 2020-07-23

## 2020-07-23 ENCOUNTER — OUTPATIENT (OUTPATIENT)
Dept: OUTPATIENT SERVICES | Facility: HOSPITAL | Age: 25
LOS: 1 days | Discharge: HOME | End: 2020-07-23

## 2020-07-23 VITALS
WEIGHT: 133.06 LBS | SYSTOLIC BLOOD PRESSURE: 100 MMHG | DIASTOLIC BLOOD PRESSURE: 58 MMHG | HEIGHT: 55 IN | BODY MASS INDEX: 30.79 KG/M2

## 2020-07-23 VITALS
DIASTOLIC BLOOD PRESSURE: 53 MMHG | HEART RATE: 83 BPM | OXYGEN SATURATION: 98 % | RESPIRATION RATE: 18 BRPM | TEMPERATURE: 98 F | SYSTOLIC BLOOD PRESSURE: 101 MMHG

## 2020-07-23 DIAGNOSIS — M79.659 PAIN IN UNSPECIFIED THIGH: ICD-10-CM

## 2020-07-23 DIAGNOSIS — O99.89 OTHER SPECIFIED DISEASES AND CONDITIONS COMPLICATING PREGNANCY, CHILDBIRTH AND THE PUERPERIUM: ICD-10-CM

## 2020-07-23 DIAGNOSIS — Z79.52 LONG TERM (CURRENT) USE OF SYSTEMIC STEROIDS: ICD-10-CM

## 2020-07-23 DIAGNOSIS — M79.651 PAIN IN RIGHT THIGH: ICD-10-CM

## 2020-07-23 DIAGNOSIS — M79.661 PAIN IN RIGHT LOWER LEG: ICD-10-CM

## 2020-07-23 DIAGNOSIS — Z79.899 OTHER LONG TERM (CURRENT) DRUG THERAPY: ICD-10-CM

## 2020-07-23 DIAGNOSIS — Z3A.38 38 WEEKS GESTATION OF PREGNANCY: ICD-10-CM

## 2020-07-23 PROCEDURE — 99213 OFFICE O/P EST LOW 20 MIN: CPT

## 2020-07-23 PROCEDURE — 93971 EXTREMITY STUDY: CPT | Mod: 26,RT

## 2020-07-23 PROCEDURE — 76815 OB US LIMITED FETUS(S): CPT | Mod: 26

## 2020-07-23 PROCEDURE — 99285 EMERGENCY DEPT VISIT HI MDM: CPT | Mod: 25

## 2020-07-23 NOTE — ED PROVIDER NOTE - CLINICAL SUMMARY MEDICAL DECISION MAKING FREE TEXT BOX
Pt 9mo pregnant presenting with RLE pain for the past few days. No trauma. no cp/sob. Ambulatory. Exam unremarkable, no ttp throughout. NVI. 2+ equal pulses throughout. FH nml. duplex neg. Comfortable with discharge and follow-up outpatient, strict return precautions given. Endorses understanding of all of this and aware that they can return at any time for new or concerning symptoms. No further questions or concerns at this time

## 2020-07-23 NOTE — ED PROVIDER NOTE - OBJECTIVE STATEMENT
24yF  at 9months GA, p/w RLE pain X 3 days, referred in by ob Dr. Faria for dvt evaluation. pt experiencing intermittent cramping severe pain in calf radiating up to posterior thigh. states she has been hydrating. no sob cp leg swelling or erythema.

## 2020-07-23 NOTE — ED PROVIDER NOTE - PATIENT PORTAL LINK FT
You can access the FollowMyHealth Patient Portal offered by [facility] by registering at the following website: [email or web address]. By joining [employer]’s FollowMyHealth portal, you will also be able to view your health information using other applications (apps) compatible with our system.

## 2020-07-23 NOTE — ED PROVIDER NOTE - PHYSICAL EXAMINATION
Vital Signs: I have reviewed the initial vital signs.  Constitutional: NAD, well-nourished, appears stated age, no acute distress.  HEENT: Airway patent, moist MM, no erythema/swelling/deformity of oral structures. EOMI, PERRLA.  CV: regular rate, regular rhythm, well-perfused extremities, 2+ b/l DP and radial pulses equal.  Lungs: BCTA, no increased WOB.  ABD: NTND, no guarding or rebound, no pulsatile mass, no hernias. gravid abdomen.  MSK: Neck supple, nontender, nl ROM, no stepoff. Chest nontender. Back nontender in TLS spine or to b/l bony structures or flanks. R calf briefly ttp with squeezing gastrocnemius. Patient describes sensation of a spasm with palpation of gastrocnemius. Ext otherwise nontender, nl rom, no deformity.   INTEG: Skin warm, dry, no rash.  NEURO: A&Ox3, moving all extremities, normal speech  PSYCH: Calm, cooperative, normal affect and interaction.

## 2020-07-23 NOTE — ED PROVIDER NOTE - NS ED ROS FT
Eyes:  No visual changes, eye pain or discharge.  ENMT:  No hearing changes, pain, no sore throat or runny nose, no difficulty swallowing  Cardiac:  No chest pain, SOB or edema. No chest pain with exertion.  Respiratory:  No cough or respiratory distress.    GI:  No nausea, vomiting, diarrhea or abdominal pain.  :  No dysuria, frequency or burning.  MS:  No joint pain or back pain. +calf and thigh pain  Neuro:  No headache or weakness.  No LOC.  Skin:  No skin rash.   Endocrine: No history of thyroid disease or diabetes.

## 2020-07-24 LAB
BILIRUB UR QL STRIP: NORMAL
CLARITY UR: CLEAR
COLLECTION METHOD: NORMAL
GLUCOSE UR-MCNC: NORMAL
HCG UR QL: 0.2 EU/DL
HGB UR QL STRIP.AUTO: NORMAL
KETONES UR-MCNC: NORMAL
LEUKOCYTE ESTERASE UR QL STRIP: NORMAL
NITRITE UR QL STRIP: NORMAL
PH UR STRIP: 7
PROT UR STRIP-MCNC: NORMAL
SP GR UR STRIP: 1.01

## 2020-07-30 ENCOUNTER — OUTPATIENT (OUTPATIENT)
Dept: OUTPATIENT SERVICES | Facility: HOSPITAL | Age: 25
LOS: 1 days | Discharge: HOME | End: 2020-07-30

## 2020-07-30 ENCOUNTER — RESULT CHARGE (OUTPATIENT)
Age: 25
End: 2020-07-30

## 2020-07-30 ENCOUNTER — NON-APPOINTMENT (OUTPATIENT)
Age: 25
End: 2020-07-30

## 2020-07-30 ENCOUNTER — APPOINTMENT (OUTPATIENT)
Dept: OBGYN | Facility: CLINIC | Age: 25
End: 2020-07-30
Payer: MEDICAID

## 2020-07-30 VITALS — WEIGHT: 135 LBS | SYSTOLIC BLOOD PRESSURE: 100 MMHG | DIASTOLIC BLOOD PRESSURE: 60 MMHG

## 2020-07-30 LAB
BILIRUB UR QL STRIP: NEGATIVE
CLARITY UR: CLEAR
COLLECTION METHOD: NORMAL
GLUCOSE UR-MCNC: NEGATIVE
HCG UR QL: NEGATIVE EU/DL
HGB UR QL STRIP.AUTO: NEGATIVE
KETONES UR-MCNC: NEGATIVE
LEUKOCYTE ESTERASE UR QL STRIP: NEGATIVE
NITRITE UR QL STRIP: NEGATIVE
PH UR STRIP: 6
PROT UR STRIP-MCNC: NEGATIVE
SP GR UR STRIP: 1.02

## 2020-07-30 PROCEDURE — 99213 OFFICE O/P EST LOW 20 MIN: CPT

## 2020-08-03 ENCOUNTER — LABORATORY RESULT (OUTPATIENT)
Age: 25
End: 2020-08-03

## 2020-08-03 ENCOUNTER — OUTPATIENT (OUTPATIENT)
Dept: OUTPATIENT SERVICES | Facility: HOSPITAL | Age: 25
LOS: 1 days | Discharge: HOME | End: 2020-08-03

## 2020-08-03 DIAGNOSIS — Z11.59 ENCOUNTER FOR SCREENING FOR OTHER VIRAL DISEASES: ICD-10-CM

## 2020-08-05 ENCOUNTER — INPATIENT (INPATIENT)
Facility: HOSPITAL | Age: 25
LOS: 1 days | Discharge: HOME | End: 2020-08-07
Attending: OBSTETRICS & GYNECOLOGY | Admitting: OBSTETRICS & GYNECOLOGY
Payer: MEDICAID

## 2020-08-05 VITALS — DIASTOLIC BLOOD PRESSURE: 67 MMHG | SYSTOLIC BLOOD PRESSURE: 114 MMHG | HEART RATE: 77 BPM

## 2020-08-05 LAB
AMPHET UR-MCNC: NEGATIVE — SIGNIFICANT CHANGE UP
APPEARANCE UR: ABNORMAL
BACTERIA # UR AUTO: NEGATIVE — SIGNIFICANT CHANGE UP
BARBITURATES UR SCN-MCNC: NEGATIVE — SIGNIFICANT CHANGE UP
BASOPHILS # BLD AUTO: 0.02 K/UL — SIGNIFICANT CHANGE UP (ref 0–0.2)
BASOPHILS NFR BLD AUTO: 0.3 % — SIGNIFICANT CHANGE UP (ref 0–1)
BENZODIAZ UR-MCNC: NEGATIVE — SIGNIFICANT CHANGE UP
BILIRUB UR-MCNC: NEGATIVE — SIGNIFICANT CHANGE UP
BLD GP AB SCN SERPL QL: SIGNIFICANT CHANGE UP
BUPRENORPHINE SCREEN, URINE RESULT: NEGATIVE — SIGNIFICANT CHANGE UP
COCAINE METAB.OTHER UR-MCNC: NEGATIVE — SIGNIFICANT CHANGE UP
COLOR SPEC: COLORLESS — SIGNIFICANT CHANGE UP
DIFF PNL FLD: SIGNIFICANT CHANGE UP
EOSINOPHIL # BLD AUTO: 0.04 K/UL — SIGNIFICANT CHANGE UP (ref 0–0.7)
EOSINOPHIL NFR BLD AUTO: 0.5 % — SIGNIFICANT CHANGE UP (ref 0–8)
EPI CELLS # UR: 17 /HPF — HIGH (ref 0–5)
FENTANYL UR QL: NEGATIVE — SIGNIFICANT CHANGE UP
GLUCOSE UR QL: NEGATIVE — SIGNIFICANT CHANGE UP
HCT VFR BLD CALC: 37.7 % — SIGNIFICANT CHANGE UP (ref 37–47)
HGB BLD-MCNC: 12.5 G/DL — SIGNIFICANT CHANGE UP (ref 12–16)
HYALINE CASTS # UR AUTO: 1 /LPF — SIGNIFICANT CHANGE UP (ref 0–7)
IMM GRANULOCYTES NFR BLD AUTO: 1.5 % — HIGH (ref 0.1–0.3)
KETONES UR-MCNC: NEGATIVE — SIGNIFICANT CHANGE UP
L&D DRUG SCREEN, URINE: SIGNIFICANT CHANGE UP
LEUKOCYTE ESTERASE UR-ACNC: NEGATIVE — SIGNIFICANT CHANGE UP
LYMPHOCYTES # BLD AUTO: 1.55 K/UL — SIGNIFICANT CHANGE UP (ref 1.2–3.4)
LYMPHOCYTES # BLD AUTO: 20.7 % — SIGNIFICANT CHANGE UP (ref 20.5–51.1)
MCHC RBC-ENTMCNC: 29.9 PG — SIGNIFICANT CHANGE UP (ref 27–31)
MCHC RBC-ENTMCNC: 33.2 G/DL — SIGNIFICANT CHANGE UP (ref 32–37)
MCV RBC AUTO: 90.2 FL — SIGNIFICANT CHANGE UP (ref 81–99)
METHADONE UR-MCNC: NEGATIVE — SIGNIFICANT CHANGE UP
MONOCYTES # BLD AUTO: 0.54 K/UL — SIGNIFICANT CHANGE UP (ref 0.1–0.6)
MONOCYTES NFR BLD AUTO: 7.2 % — SIGNIFICANT CHANGE UP (ref 1.7–9.3)
NEUTROPHILS # BLD AUTO: 5.21 K/UL — SIGNIFICANT CHANGE UP (ref 1.4–6.5)
NEUTROPHILS NFR BLD AUTO: 69.8 % — SIGNIFICANT CHANGE UP (ref 42.2–75.2)
NITRITE UR-MCNC: NEGATIVE — SIGNIFICANT CHANGE UP
NRBC # BLD: 0 /100 WBCS — SIGNIFICANT CHANGE UP (ref 0–0)
OPIATES UR-MCNC: NEGATIVE — SIGNIFICANT CHANGE UP
OXYCODONE UR-MCNC: NEGATIVE — SIGNIFICANT CHANGE UP
PCP UR-MCNC: NEGATIVE — SIGNIFICANT CHANGE UP
PH UR: 7 — SIGNIFICANT CHANGE UP (ref 5–8)
PLATELET # BLD AUTO: 171 K/UL — SIGNIFICANT CHANGE UP (ref 130–400)
PRENATAL SYPHILIS TEST: SIGNIFICANT CHANGE UP
PROPOXYPHENE QUALITATIVE URINE RESULT: NEGATIVE — SIGNIFICANT CHANGE UP
PROT UR-MCNC: NEGATIVE — SIGNIFICANT CHANGE UP
RBC # BLD: 4.18 M/UL — LOW (ref 4.2–5.4)
RBC # FLD: 13.6 % — SIGNIFICANT CHANGE UP (ref 11.5–14.5)
RBC CASTS # UR COMP ASSIST: 3 /HPF — SIGNIFICANT CHANGE UP (ref 0–4)
SP GR SPEC: 1.01 — LOW (ref 1.01–1.02)
UROBILINOGEN FLD QL: SIGNIFICANT CHANGE UP
WBC # BLD: 7.47 K/UL — SIGNIFICANT CHANGE UP (ref 4.8–10.8)
WBC # FLD AUTO: 7.47 K/UL — SIGNIFICANT CHANGE UP (ref 4.8–10.8)
WBC UR QL: 2 /HPF — SIGNIFICANT CHANGE UP (ref 0–5)

## 2020-08-05 PROCEDURE — 59409 OBSTETRICAL CARE: CPT | Mod: U9

## 2020-08-05 RX ORDER — BUPIVACAINE HCL/PF 7.5 MG/ML
200 VIAL (ML) INJECTION
Refills: 0 | Status: DISCONTINUED | OUTPATIENT
Start: 2020-08-05 | End: 2020-08-07

## 2020-08-05 RX ORDER — AER TRAVELER 0.5 G/1
1 SOLUTION RECTAL; TOPICAL EVERY 4 HOURS
Refills: 0 | Status: DISCONTINUED | OUTPATIENT
Start: 2020-08-05 | End: 2020-08-07

## 2020-08-05 RX ORDER — BENZOCAINE 10 %
1 GEL (GRAM) MUCOUS MEMBRANE EVERY 6 HOURS
Refills: 0 | Status: DISCONTINUED | OUTPATIENT
Start: 2020-08-05 | End: 2020-08-07

## 2020-08-05 RX ORDER — OXYTOCIN 10 UNIT/ML
333.33 VIAL (ML) INJECTION
Qty: 20 | Refills: 0 | Status: DISCONTINUED | OUTPATIENT
Start: 2020-08-05 | End: 2020-08-07

## 2020-08-05 RX ORDER — SODIUM CHLORIDE 9 MG/ML
3 INJECTION INTRAMUSCULAR; INTRAVENOUS; SUBCUTANEOUS EVERY 8 HOURS
Refills: 0 | Status: DISCONTINUED | OUTPATIENT
Start: 2020-08-05 | End: 2020-08-07

## 2020-08-05 RX ORDER — ACETAMINOPHEN 500 MG
975 TABLET ORAL
Refills: 0 | Status: DISCONTINUED | OUTPATIENT
Start: 2020-08-05 | End: 2020-08-07

## 2020-08-05 RX ORDER — SIMETHICONE 80 MG/1
80 TABLET, CHEWABLE ORAL EVERY 4 HOURS
Refills: 0 | Status: DISCONTINUED | OUTPATIENT
Start: 2020-08-05 | End: 2020-08-07

## 2020-08-05 RX ORDER — OXYCODONE HYDROCHLORIDE 5 MG/1
5 TABLET ORAL ONCE
Refills: 0 | Status: DISCONTINUED | OUTPATIENT
Start: 2020-08-05 | End: 2020-08-07

## 2020-08-05 RX ORDER — DINOPROSTONE 10 MG/241MG
10 INSERT VAGINAL ONCE
Refills: 0 | Status: COMPLETED | OUTPATIENT
Start: 2020-08-05 | End: 2020-08-05

## 2020-08-05 RX ORDER — SODIUM CHLORIDE 9 MG/ML
1000 INJECTION, SOLUTION INTRAVENOUS
Refills: 0 | Status: DISCONTINUED | OUTPATIENT
Start: 2020-08-05 | End: 2020-08-06

## 2020-08-05 RX ORDER — DIPHENHYDRAMINE HCL 50 MG
25 CAPSULE ORAL EVERY 6 HOURS
Refills: 0 | Status: DISCONTINUED | OUTPATIENT
Start: 2020-08-05 | End: 2020-08-07

## 2020-08-05 RX ORDER — DIBUCAINE 1 %
1 OINTMENT (GRAM) RECTAL EVERY 6 HOURS
Refills: 0 | Status: DISCONTINUED | OUTPATIENT
Start: 2020-08-05 | End: 2020-08-07

## 2020-08-05 RX ORDER — NALOXONE HYDROCHLORIDE 4 MG/.1ML
0.1 SPRAY NASAL
Refills: 0 | Status: DISCONTINUED | OUTPATIENT
Start: 2020-08-05 | End: 2020-08-07

## 2020-08-05 RX ORDER — OXYCODONE HYDROCHLORIDE 5 MG/1
5 TABLET ORAL
Refills: 0 | Status: DISCONTINUED | OUTPATIENT
Start: 2020-08-05 | End: 2020-08-07

## 2020-08-05 RX ORDER — DEXAMETHASONE 0.5 MG/5ML
4 ELIXIR ORAL EVERY 6 HOURS
Refills: 0 | Status: DISCONTINUED | OUTPATIENT
Start: 2020-08-05 | End: 2020-08-07

## 2020-08-05 RX ORDER — ONDANSETRON 8 MG/1
4 TABLET, FILM COATED ORAL EVERY 6 HOURS
Refills: 0 | Status: DISCONTINUED | OUTPATIENT
Start: 2020-08-05 | End: 2020-08-07

## 2020-08-05 RX ORDER — IBUPROFEN 200 MG
600 TABLET ORAL EVERY 6 HOURS
Refills: 0 | Status: COMPLETED | OUTPATIENT
Start: 2020-08-05 | End: 2021-07-04

## 2020-08-05 RX ORDER — KETOROLAC TROMETHAMINE 30 MG/ML
30 SYRINGE (ML) INJECTION ONCE
Refills: 0 | Status: DISCONTINUED | OUTPATIENT
Start: 2020-08-05 | End: 2020-08-05

## 2020-08-05 RX ORDER — LANOLIN
1 OINTMENT (GRAM) TOPICAL EVERY 6 HOURS
Refills: 0 | Status: DISCONTINUED | OUTPATIENT
Start: 2020-08-05 | End: 2020-08-07

## 2020-08-05 RX ORDER — MAGNESIUM HYDROXIDE 400 MG/1
30 TABLET, CHEWABLE ORAL
Refills: 0 | Status: DISCONTINUED | OUTPATIENT
Start: 2020-08-05 | End: 2020-08-07

## 2020-08-05 RX ORDER — PANTOPRAZOLE SODIUM 20 MG/1
1 TABLET, DELAYED RELEASE ORAL
Qty: 0 | Refills: 0 | DISCHARGE

## 2020-08-05 RX ORDER — HYDROCORTISONE 1 %
1 OINTMENT (GRAM) TOPICAL EVERY 6 HOURS
Refills: 0 | Status: DISCONTINUED | OUTPATIENT
Start: 2020-08-05 | End: 2020-08-07

## 2020-08-05 RX ADMIN — Medication 1000 MILLIUNIT(S)/MIN: at 21:12

## 2020-08-05 RX ADMIN — Medication 30 MILLIGRAM(S): at 21:49

## 2020-08-05 RX ADMIN — DINOPROSTONE 10 MILLIGRAM(S): 10 INSERT VAGINAL at 12:20

## 2020-08-05 RX ADMIN — Medication 30 MILLIGRAM(S): at 21:15

## 2020-08-05 NOTE — OB PROVIDER H&P - HISTORY OF PRESENT ILLNESS
23yo  @40w2d, LEN: 2020 by second trim sono, here for scheduled IOL for Post-EDC. Denies VB, ctx, LOF. Good FM. Patient was late to care, pregnancy while on Depo-provera, and found to be pregnant at 24w. Denies any other complications during this pregnancy.

## 2020-08-05 NOTE — PROCEDURE NOTE - ADDITIONAL PROCEDURE DETAILS
VSS  Analgesia at T10 R=L VSS  Analgesia at T10 R=L  18.50pm Top off given to pt. 100mcg fentanyl with 5cc .25% bupivacaine.

## 2020-08-05 NOTE — OB PROVIDER H&P - NSHPPHYSICALEXAM_GEN_ALL_CORE
Vital Signs Last 24 Hrs  T(F): 98.1 (05 Aug 2020 10:26), Max: 98.1 (05 Aug 2020 10:26)  HR: 89 (05 Aug 2020 10:51) (77 - 89)  BP: 141/76 (05 Aug 2020 10:51) (114/67 - 141/76)  RR: 20 (05 Aug 2020 10:26) (20 - 20)    EFM: 130/moderate variability/+accels  [place]: q10min  SVE: 2.5/0/-3, vertex by sono, intact  Abd: soft, gravid, nontender Vital Signs Last 24 Hrs  T(F): 98.1 (05 Aug 2020 10:26), Max: 98.1 (05 Aug 2020 10:26)  HR: 89 (05 Aug 2020 10:51) (77 - 89)  BP: 141/76 (05 Aug 2020 10:51) (114/67 - 141/76)  RR: 20 (05 Aug 2020 10:26) (20 - 20)    EFM: 130/moderate variability/+accels  Strong: q10min    Abd: soft, gravid, nontender

## 2020-08-05 NOTE — PROGRESS NOTE ADULT - SUBJECTIVE AND OBJECTIVE BOX
PGY 3 Note    S: Pt seen and examined at bedside. Complaining of pain with contractions.     Vital Signs Last 24 Hrs  T(F): 98.1 (05 Aug 2020 10:26), Max: 98.1 (05 Aug 2020 10:26)  HR: 73 (05 Aug 2020 14:25) (67 - 89)  BP: 114/69 (05 Aug 2020 14:25) (113/56 - 141/76)  RR: 20 (05 Aug 2020 10:26) (20 - 20)    EFM: 130/mod bridgett/+accel  TOCO: q1-3min  SVE: 4/70/-2, vtx, intact, cervidil removed    Labs:                        12.5   7.47  )-----------( 171      ( 05 Aug 2020 09:10 )             37.7             ABO RH Interpretation: O POS (20 @ 11:02)    Urinalysis Basic - ( 05 Aug 2020 10:43 )    Color: Colorless / Appearance: Slightly Turbid / S.009 / pH: x  Gluc: x / Ketone: Negative  / Bili: Negative / Urobili: <2 mg/dL   Blood: x / Protein: Negative / Nitrite: Negative   Leuk Esterase: Negative / RBC: 3 /HPF / WBC 2 /HPF   Sq Epi: x / Non Sq Epi: 17 /HPF / Bacteria: Negative        Prenatal Syphilis Test: Nonreact (20 @ 09:10)      Meds:  @1230 cervidil inserted, removed @1440

## 2020-08-05 NOTE — OB PROVIDER DELIVERY SUMMARY - NSPROVIDERDELIVERYNOTE_OBGYN_ALL_OB_FT
Patient was fully dilated and pushing. Fetal head was OA and restituted to LOT. The anterior and posterior shoulders delivered, followed by the remaining body atraumatically. Delayed cord clamping was performed, and then clamped and cut. Cord blood gases collected x2. The  was handed to the mother and RN. The placenta delivered intact with membranes. Pitocin was administered. Uterus massaged, fundus found to be firm. Cervix, vagina and perineum inspected no lacerations with good hemostasis.     Viable female infant delivered, weighing 3870g, with APGARs 8/9    Laceration: none       Dr. Sales present for the delivery Patient was fully dilated and pushing. Fetal head was OA and restituted to LOT. The anterior and posterior shoulders delivered, followed by the remaining body atraumatically. Delayed cord clamping was performed, and then clamped and cut. Cord blood gases collected x2. The  was handed to the mother and RN. The placenta delivered intact with membranes. Pitocin was administered. Uterus massaged, fundus found to be firm. Cervix, vagina and perineum inspected no lacerations with good hemostasis.     Viable female infant delivered, weighing 3870g, with APGARs 8/9    Laceration: none       Dr. Sales present for the delivery    OB ATTENDING: Present and performed uncomplicated vaginal delivery. -LB

## 2020-08-05 NOTE — OB PROVIDER H&P - NSHPLABSRESULTS_GEN_ALL_CORE
GCT: 141  Quant Gold: neg  Varicella: immune  GC/CT: neg    Sonos:  24w0d: SIUP, c/w 24w  24w3d: fetal arrhythmia seen, all other anatomy normal, 739g (59%), MVP 4.36cm, post placenta  26w3d: normal cardiac anatomy, no arrhythmia  32w3d: 2160g (67%), MVP: 4.18cm, posterior placenta, vertex

## 2020-08-05 NOTE — PROGRESS NOTE ADULT - ASSESSMENT
25 yo  at 40w2d, GBS negative , s/p cervidil, epidural in place, AROM clear, undergoing IOL  - Continuous EFM and toco  - IV fluids  - Clear liquid diet  - Pain management PRN  - Will monitor labor, if no regular contractions or cervical change after AROM, will start pitocin    Discussed with Dr. Sales

## 2020-08-05 NOTE — PROGRESS NOTE ADULT - SUBJECTIVE AND OBJECTIVE BOX
Pt evaluated at bedside, feeling comfortable, pain well controlled. Is comfortable with epidural in place    Vital Signs Last 24 Hrs  T(F): 98.1 (05 Aug 2020 10:26), Max: 98.1 (05 Aug 2020 10:26)  HR: 78 (05 Aug 2020 17:22) (67 - 95)  BP: 105/57 (05 Aug 2020 17:06) (105/57 - 141/76)  RR: 20 (05 Aug 2020 10:26) (20 - 20)    EFM: 130/mod/accels  Peak: irregular  SVE: 4/80/-1. AROM clear    Labs:  None new    Meds:  Epidural in place

## 2020-08-05 NOTE — OB RN PATIENT PROFILE - PROVIDER NOTIFICATION
Please follow up with Dr. Gale this week.  Pelvic rest - no intercourse.    Threatened Miscarriage  A threatened miscarriage is when you have bleeding from your vagina during the first 20 weeks of pregnancy but the pregnancy does not end. Your doctor will do tests to make sure you are still pregnant. The cause of the bleeding may not be known. This condition does not mean your pregnancy will end, but it does increase the risk that it will end (complete miscarriage).    Follow these instructions at home:  Get plenty of rest.  If you have bleeding in your vagina, do not have sex or use tampons.  Do not douche.  Do not smoke or use drugs.  Do not drink alcohol.  Avoid caffeine.  Keep all follow-up prenatal visits as told by your doctor. This is important.  Contact a doctor if:  You have light bleeding from your vagina.  You have belly pain or cramping.  You have a fever.  Get help right away if:  You have heavy bleeding from your vagina.  You have clots of blood coming from your vagina.  You pass tissue from your vagina.  You have a gush of fluid from your vagina.  You are leaking fluid from your vagina.  You have very bad pain or cramps in your low back or belly.  You have fever, chills, and very bad belly pain.  Summary  A threatened miscarriage is when you have bleeding from your vagina during the first 20 weeks of pregnancy but the pregnancy does not end.  This condition does not mean your pregnancy will end, but it does increase the risk that it will end (complete miscarriage).  Get plenty of rest. If you have bleeding in your vagina, do not have sex or use tampons.  Keep all follow-up prenatal visits as told by your doctor. This is important.  This information is not intended to replace advice given to you by your health care provider. Make sure you discuss any questions you have with your health care provider. Declines

## 2020-08-05 NOTE — OB RN PATIENT PROFILE - NS_PARA_OBGYN_ALL_OB_NU
Problem: Occupational Therapy Goal  Goal: Occupational Therapy Goal  Updated Goals to be met by: 11/4/18    Pt to be properly positioned 100% of time by family & staff  Pt will remain in quiet organized state for 50% of session  Pt will tolerate tactile stimulation with <50% signs of stress during 3 consecutive sessions  Pt eyes will remain open for 50% of session  Parents will demonstrate dev handling caregiving techniques while pt is calm & organized  Pt will tolerate prom to all 4 extremities with no tightness noted  Pt will bring hands to mouth & midline 5-7 times per session  Pt will maintain eye contact for 3-5 seconds for 3 trials in a session   Pt will tolerate position changes with vital sign stability 75% of the time  Pt will maintain head in midline with fair head control 3 times during session  Pt will suck pacifier with fair suck & latch in prep for oral fdg  Family will be independent with hep for development stimulation   Outcome: Ongoing (interventions implemented as appropriate)  Pt tolerated handling fairly this session with moderate signs of stress.  She demonstrated fair response to facilitation techniques for diaphragmatic stimulation.  Pt continues to appear to enjoy supported sitting with stable vitals and active gaze around the room.  Head control poor in supported sitting.  Pt calm in quiet state upon therapist exit.   Progress toward previous goals: Continue goals; progressing  SUE Phillip  2018         2

## 2020-08-05 NOTE — PROGRESS NOTE ADULT - ASSESSMENT
25 yo  @40w2d, GBS neg, IOL for post EDC, isolated elevated BP, s/p cervidil, doing well.    - will call for epidural now  - cont EFM and toco  - monitor vitals  - clear liquid diet, IV hydration  - f/up UDS    Dr. Sales aware.

## 2020-08-06 ENCOUNTER — TRANSCRIPTION ENCOUNTER (OUTPATIENT)
Age: 25
End: 2020-08-06

## 2020-08-06 LAB
BASOPHILS # BLD AUTO: 0.02 K/UL — SIGNIFICANT CHANGE UP (ref 0–0.2)
BASOPHILS # BLD AUTO: 0.02 K/UL — SIGNIFICANT CHANGE UP (ref 0–0.2)
BASOPHILS NFR BLD AUTO: 0.2 % — SIGNIFICANT CHANGE UP (ref 0–1)
BASOPHILS NFR BLD AUTO: 0.2 % — SIGNIFICANT CHANGE UP (ref 0–1)
EOSINOPHIL # BLD AUTO: 0.04 K/UL — SIGNIFICANT CHANGE UP (ref 0–0.7)
EOSINOPHIL # BLD AUTO: 0.04 K/UL — SIGNIFICANT CHANGE UP (ref 0–0.7)
EOSINOPHIL NFR BLD AUTO: 0.4 % — SIGNIFICANT CHANGE UP (ref 0–8)
EOSINOPHIL NFR BLD AUTO: 0.4 % — SIGNIFICANT CHANGE UP (ref 0–8)
HCT VFR BLD CALC: 26.6 % — LOW (ref 37–47)
HCT VFR BLD CALC: 27.1 % — LOW (ref 37–47)
HGB BLD-MCNC: 8.8 G/DL — LOW (ref 12–16)
HGB BLD-MCNC: 8.8 G/DL — LOW (ref 12–16)
IMM GRANULOCYTES NFR BLD AUTO: 0.6 % — HIGH (ref 0.1–0.3)
IMM GRANULOCYTES NFR BLD AUTO: 0.6 % — HIGH (ref 0.1–0.3)
LYMPHOCYTES # BLD AUTO: 1.51 K/UL — SIGNIFICANT CHANGE UP (ref 1.2–3.4)
LYMPHOCYTES # BLD AUTO: 1.98 K/UL — SIGNIFICANT CHANGE UP (ref 1.2–3.4)
LYMPHOCYTES # BLD AUTO: 15 % — LOW (ref 20.5–51.1)
LYMPHOCYTES # BLD AUTO: 20.8 % — SIGNIFICANT CHANGE UP (ref 20.5–51.1)
MCHC RBC-ENTMCNC: 29.6 PG — SIGNIFICANT CHANGE UP (ref 27–31)
MCHC RBC-ENTMCNC: 29.9 PG — SIGNIFICANT CHANGE UP (ref 27–31)
MCHC RBC-ENTMCNC: 32.5 G/DL — SIGNIFICANT CHANGE UP (ref 32–37)
MCHC RBC-ENTMCNC: 33.1 G/DL — SIGNIFICANT CHANGE UP (ref 32–37)
MCV RBC AUTO: 90.5 FL — SIGNIFICANT CHANGE UP (ref 81–99)
MCV RBC AUTO: 91.2 FL — SIGNIFICANT CHANGE UP (ref 81–99)
MONOCYTES # BLD AUTO: 0.48 K/UL — SIGNIFICANT CHANGE UP (ref 0.1–0.6)
MONOCYTES # BLD AUTO: 0.51 K/UL — SIGNIFICANT CHANGE UP (ref 0.1–0.6)
MONOCYTES NFR BLD AUTO: 5.1 % — SIGNIFICANT CHANGE UP (ref 1.7–9.3)
MONOCYTES NFR BLD AUTO: 5.1 % — SIGNIFICANT CHANGE UP (ref 1.7–9.3)
NEUTROPHILS # BLD AUTO: 6.92 K/UL — HIGH (ref 1.4–6.5)
NEUTROPHILS # BLD AUTO: 7.91 K/UL — HIGH (ref 1.4–6.5)
NEUTROPHILS NFR BLD AUTO: 72.9 % — SIGNIFICANT CHANGE UP (ref 42.2–75.2)
NEUTROPHILS NFR BLD AUTO: 78.7 % — HIGH (ref 42.2–75.2)
NRBC # BLD: 0 /100 WBCS — SIGNIFICANT CHANGE UP (ref 0–0)
NRBC # BLD: 0 /100 WBCS — SIGNIFICANT CHANGE UP (ref 0–0)
PLATELET # BLD AUTO: 160 K/UL — SIGNIFICANT CHANGE UP (ref 130–400)
PLATELET # BLD AUTO: 189 K/UL — SIGNIFICANT CHANGE UP (ref 130–400)
RBC # BLD: 2.94 M/UL — LOW (ref 4.2–5.4)
RBC # BLD: 2.97 M/UL — LOW (ref 4.2–5.4)
RBC # FLD: 13.7 % — SIGNIFICANT CHANGE UP (ref 11.5–14.5)
RBC # FLD: 13.9 % — SIGNIFICANT CHANGE UP (ref 11.5–14.5)
WBC # BLD: 10.05 K/UL — SIGNIFICANT CHANGE UP (ref 4.8–10.8)
WBC # BLD: 9.5 K/UL — SIGNIFICANT CHANGE UP (ref 4.8–10.8)
WBC # FLD AUTO: 10.05 K/UL — SIGNIFICANT CHANGE UP (ref 4.8–10.8)
WBC # FLD AUTO: 9.5 K/UL — SIGNIFICANT CHANGE UP (ref 4.8–10.8)

## 2020-08-06 PROCEDURE — 99238 HOSP IP/OBS DSCHRG MGMT 30/<: CPT

## 2020-08-06 RX ORDER — IBUPROFEN 200 MG
600 TABLET ORAL EVERY 6 HOURS
Refills: 0 | Status: DISCONTINUED | OUTPATIENT
Start: 2020-08-06 | End: 2020-08-07

## 2020-08-06 RX ORDER — FERROUS SULFATE 325(65) MG
325 TABLET ORAL DAILY
Refills: 0 | Status: DISCONTINUED | OUTPATIENT
Start: 2020-08-06 | End: 2020-08-07

## 2020-08-06 RX ORDER — IBUPROFEN 200 MG
1 TABLET ORAL
Qty: 0 | Refills: 0 | DISCHARGE
Start: 2020-08-06

## 2020-08-06 RX ORDER — ACETAMINOPHEN 500 MG
3 TABLET ORAL
Qty: 0 | Refills: 0 | DISCHARGE
Start: 2020-08-06

## 2020-08-06 RX ADMIN — Medication 975 MILLIGRAM(S): at 21:36

## 2020-08-06 RX ADMIN — Medication 975 MILLIGRAM(S): at 07:33

## 2020-08-06 RX ADMIN — Medication 600 MILLIGRAM(S): at 03:44

## 2020-08-06 RX ADMIN — Medication 325 MILLIGRAM(S): at 17:16

## 2020-08-06 RX ADMIN — Medication 1 TABLET(S): at 11:17

## 2020-08-06 RX ADMIN — Medication 600 MILLIGRAM(S): at 09:00

## 2020-08-06 RX ADMIN — Medication 600 MILLIGRAM(S): at 23:45

## 2020-08-06 RX ADMIN — Medication 600 MILLIGRAM(S): at 08:19

## 2020-08-06 RX ADMIN — Medication 975 MILLIGRAM(S): at 22:06

## 2020-08-06 RX ADMIN — Medication 975 MILLIGRAM(S): at 00:20

## 2020-08-06 RX ADMIN — SODIUM CHLORIDE 3 MILLILITER(S): 9 INJECTION INTRAMUSCULAR; INTRAVENOUS; SUBCUTANEOUS at 14:18

## 2020-08-06 RX ADMIN — Medication 975 MILLIGRAM(S): at 12:00

## 2020-08-06 RX ADMIN — Medication 975 MILLIGRAM(S): at 11:16

## 2020-08-06 RX ADMIN — SODIUM CHLORIDE 3 MILLILITER(S): 9 INJECTION INTRAMUSCULAR; INTRAVENOUS; SUBCUTANEOUS at 07:31

## 2020-08-06 RX ADMIN — Medication 600 MILLIGRAM(S): at 15:00

## 2020-08-06 RX ADMIN — Medication 600 MILLIGRAM(S): at 14:17

## 2020-08-06 NOTE — DISCHARGE NOTE OB - PATIENT PORTAL LINK FT
You can access the FollowMyHealth Patient Portal offered by [facility] by registering at the following website: [email or web address]. By joining [employer]’s FollowMyHealth portal, you will also be able to view your health information using other applications (apps) compatible with our system.

## 2020-08-06 NOTE — CHART NOTE - NSCHARTNOTEFT_GEN_A_CORE
OBGYN [id number]:     Pt seen and evaluated at bedside for H/H drop. Denies dizziness/lightheadedness/CP/SOB/palpitations. Ambulating, voiding, tolerating regular diet without difficulty.     Vital Signs Last 24 Hrs  T(C): 36.4 (06 Aug 2020 08:27), Max: 37 (05 Aug 2020 22:49)  T(F): 97.5 (06 Aug 2020 08:27), Max: 98.6 (05 Aug 2020 22:49)  HR: 77 (06 Aug 2020 08:27) (67 - 124) MANUAL HR: 84 BPM   BP: 97/53 (06 Aug 2020 08:27) (94/53 - 126/62)  RR: 18 (06 Aug 2020 08:27) (16 - 18)  SpO2: 98% (05 Aug 2020 20:43) (96% - 99%)     Abd: soft, nontender, fundus firm   No active bleeding     - monitor vitals/bleeding   - PO iron   - anticipate d/c home today or tomorrow OBGYN [id number]:     Pt seen and evaluated at bedside for H/H drop. Denies dizziness/lightheadedness/CP/SOB/palpitations. Denies heavy vaginal bleeding/abdominal pain. Ambulating, voiding, tolerating regular diet without difficulty.     Vital Signs Last 24 Hrs  T(C): 36.4 (06 Aug 2020 08:27), Max: 37 (05 Aug 2020 22:49)  T(F): 97.5 (06 Aug 2020 08:27), Max: 98.6 (05 Aug 2020 22:49)  HR: 77 (06 Aug 2020 08:27) (67 - 124) MANUAL HR: 84 BPM   BP: 97/53 (06 Aug 2020 08:27) (94/53 - 126/62)  RR: 18 (06 Aug 2020 08:27) (16 - 18)  SpO2: 98% (05 Aug 2020 20:43) (96% - 99%)     Abd: soft, nontender, fundus firm   No active bleeding on chux     - monitor vitals/bleeding   - repeat CBC 1600   - PO iron   - anticipate d/c home today or tomorrow     Dr. Reich aware.

## 2020-08-06 NOTE — DISCHARGE NOTE OB - CARE PROVIDER_API CALL
Yarelis Sales)  OBSGYN  Physicians  [address] [place] [address]  Phone: [phone]  Fax: [phone]  Follow Up Time:

## 2020-08-06 NOTE — PROGRESS NOTE ADULT - ASSESSMENT
23yo now P3 s/p , PPD1, recovering well.    -Monitor vitals, bleeding  -f/u AM CBC  -Encourage PO hydration, ambulation  -Advance diet as tolerated  -Pain managment prn  -Simethicone prn  -Anticipate d/c home    Dr. Sales and Dr. Florez to be made aware

## 2020-08-06 NOTE — PROGRESS NOTE ADULT - SUBJECTIVE AND OBJECTIVE BOX
Chief Complaint: Postpartum    HPI: Pt doing well, pain well controlled. No overnight events, no acute complaints. Denies fever, chills, chest pain, SOB, nausea, vomiting, LE pain. Minimal bleeding, tolerating PO, ambulating, voiding, passed flatus. Breastfeeding.    PAST MEDICAL & SURGICAL HISTORY:  Gastritis  No significant past surgical history      Physical Exam  Vital Signs Last 24 Hrs  T(F): 98.3 (06 Aug 2020 00:50), Max: 98.6 (05 Aug 2020 22:49)  HR: 70 (06 Aug 2020 00:50) (67 - 124)  BP: 105/60 (06 Aug 2020 00:50) (94/53 - 141/76)  RR: 18 (06 Aug 2020 00:50) (16 - 20)    Physical exam:  General - AAOx3, NAD  Heart - S1S2, RRR  Lungs - CTA BL  Abdomen:  - Soft, nontender, nondistended, BS+  - Fundus firm, nontender, below the umbilicus  Pelvis/Vagina - Normal Lochia  Extremities - No calf tenderness, no swelling    Labs:                        12.5   7.47  )-----------( 171      ( 05 Aug 2020 09:10 )             37.7     ABO RH Interpretation: O POS (08-05-20 @ 11:02)  Antibody Screen: NEG (08-05-20 @ 11:02)    Prenatal Syphilis Test: Nonreact (08-05-20 @ 09:10)

## 2020-08-06 NOTE — PROGRESS NOTE ADULT - ATTENDING COMMENTS
Patient is PPD#1 s/p , doing well  - Follow up labs  - monitor vitals  - Follow up for postpartum visit

## 2020-08-06 NOTE — DISCHARGE NOTE OB - MEDICATION SUMMARY - MEDICATIONS TO STOP TAKING
I will STOP taking the medications listed below when I get home from the hospital:    valACYclovir 1 g oral tablet  -- 1 tab(s) by mouth 3 times a day   -- It is very important that you take or use this exactly as directed.  Do not skip doses or discontinue unless directed by your doctor.

## 2020-08-06 NOTE — DISCHARGE NOTE OB - CARE PLAN
Principal Discharge DX:	Vaginal delivery  Goal:	happy and healthy mother and baby  Assessment and plan of treatment:	If you experience any of the following, please notify your provider:  -fever >100.4F  -increased vaginal bleeding or clotting (saturating a pad an hour)  -foul smelling discharge   -severe abdominal, vaginal, or rectal pain   -persistent headache or vision changes  -swollen areas on your legs that are red, hot, or painful   -swollen, hot, painful areas and/or streaks on your breasts  -cracked or bleeding nipples  -mood swings, depression, or crying spells lasting more than 3 days     Nothing in the vagina for 6 weeks (no douching, sex or tampons). No baths, you may shower.

## 2020-08-06 NOTE — DISCHARGE NOTE OB - PLAN OF CARE
happy and healthy mother and baby If you experience any of the following, please notify your provider:  -fever >100.4F  -increased vaginal bleeding or clotting (saturating a pad an hour)  -foul smelling discharge   -severe abdominal, vaginal, or rectal pain   -persistent headache or vision changes  -swollen areas on your legs that are red, hot, or painful   -swollen, hot, painful areas and/or streaks on your breasts  -cracked or bleeding nipples  -mood swings, depression, or crying spells lasting more than 3 days     Nothing in the vagina for 6 weeks (no douching, sex or tampons). No baths, you may shower.

## 2020-08-06 NOTE — DISCHARGE NOTE OB - HOSPITAL COURSE
Patient is s/p uncomplicated . She had an uncomplicated postpartum course and has met her postpartum milestones appropriately.  Vitals are stable for discharge.

## 2020-08-07 VITALS
HEART RATE: 73 BPM | RESPIRATION RATE: 19 BRPM | DIASTOLIC BLOOD PRESSURE: 62 MMHG | TEMPERATURE: 96 F | SYSTOLIC BLOOD PRESSURE: 104 MMHG

## 2020-08-07 LAB
HBV SURFACE AG SER-ACNC: SIGNIFICANT CHANGE UP
SARS-COV-2 IGG SERPL QL IA: POSITIVE
SARS-COV-2 IGM SERPL IA-ACNC: 29 INDEX — HIGH

## 2020-08-07 PROCEDURE — 99238 HOSP IP/OBS DSCHRG MGMT 30/<: CPT

## 2020-08-07 RX ADMIN — Medication 975 MILLIGRAM(S): at 15:12

## 2020-08-07 RX ADMIN — Medication 975 MILLIGRAM(S): at 11:00

## 2020-08-07 RX ADMIN — Medication 975 MILLIGRAM(S): at 16:55

## 2020-08-07 RX ADMIN — Medication 325 MILLIGRAM(S): at 12:42

## 2020-08-07 RX ADMIN — Medication 600 MILLIGRAM(S): at 14:00

## 2020-08-07 RX ADMIN — Medication 600 MILLIGRAM(S): at 12:41

## 2020-08-07 RX ADMIN — Medication 975 MILLIGRAM(S): at 09:22

## 2020-08-07 RX ADMIN — Medication 600 MILLIGRAM(S): at 00:15

## 2020-08-07 RX ADMIN — Medication 1 TABLET(S): at 12:42

## 2020-08-07 RX ADMIN — Medication 600 MILLIGRAM(S): at 06:25

## 2020-08-07 RX ADMIN — Medication 600 MILLIGRAM(S): at 06:55

## 2020-08-07 NOTE — PROGRESS NOTE ADULT - ASSESSMENT
23yo now P3 s/p , PPD2, recovering well.    - Continue routine postpartum care  - Pain management prn  - Encourage ambulation, oral hydration  - Monitor vitals and bleeding  -anticipate d/c home today    Dr. anderson and dr. estrella to be made aware 23yo now P3 s/p , PPD2, recovering well.    - Continue routine postpartum care  - Pain management prn  - Encourage ambulation, oral hydration  - Monitor vitals and bleeding  -anticipate d/c home today  -Discharge instructions and precautions given, f/u in 6w for postpartum check,    Dr. Sales and dr. Yanez to be made aware

## 2020-08-07 NOTE — PROGRESS NOTE ADULT - ATTENDING COMMENTS
Patient is POD#2 s/p C/S, with preeclampsia and type II diabetes, doing well  - Monitor vitals  - Pain control PRN  - Routine postpartum care Patient is PPD#2 s/p , doing well  - monitor vitals  - Follow up for postpartum visit

## 2020-08-07 NOTE — PROGRESS NOTE ADULT - SUBJECTIVE AND OBJECTIVE BOX
PGY 1 Note:    Pt doing well, pain well controlled. Denies heavy vaginal bleeding. No overnight events, no acute complaints. Ambulating without difficulty, voiding, and tolerating regular diet. Breastfeeding and bottle feeding.    PAST MEDICAL & SURGICAL HISTORY:  Gastritis  No significant past surgical history      Physical Exam  Vital Signs Last 24 Hrs  T(F): 97.4 (06 Aug 2020 23:30), Max: 98.3 (06 Aug 2020 15:36)  HR: 75 (06 Aug 2020 23:30) (73 - 82)  BP: 102/51 (06 Aug 2020 23:30) (97/53 - 109/54)  RR: 18 (06 Aug 2020 23:30) (18 - 18)    Gen: AAOx3, NAD  Abd: Soft, nontender, nondistended  Fundus: Firm, nontender, below the umbilicus  Lochia: Minimal  Ext: No calf tenderness, no swelling    Labs:                        8.8    9.50  )-----------( 160      ( 06 Aug 2020 16:54 )             27.1                         8.8    10.05 )-----------( 189      ( 06 Aug 2020 11:54 )             26.6

## 2020-08-08 LAB — HBV SURFACE AG SERPL QL IA: SIGNIFICANT CHANGE UP

## 2020-08-17 DIAGNOSIS — Z3A.40 40 WEEKS GESTATION OF PREGNANCY: ICD-10-CM

## 2020-08-17 DIAGNOSIS — O48.0 POST-TERM PREGNANCY: ICD-10-CM

## 2020-08-17 DIAGNOSIS — Z67.40 TYPE O BLOOD, RH POSITIVE: ICD-10-CM

## 2020-09-18 ENCOUNTER — APPOINTMENT (OUTPATIENT)
Dept: OBGYN | Facility: CLINIC | Age: 25
End: 2020-09-18
Payer: MEDICAID

## 2020-09-18 ENCOUNTER — RESULT CHARGE (OUTPATIENT)
Age: 25
End: 2020-09-18

## 2020-09-18 ENCOUNTER — OUTPATIENT (OUTPATIENT)
Dept: OUTPATIENT SERVICES | Facility: HOSPITAL | Age: 25
LOS: 1 days | Discharge: HOME | End: 2020-09-18

## 2020-09-18 VITALS — SYSTOLIC BLOOD PRESSURE: 108 MMHG | DIASTOLIC BLOOD PRESSURE: 68 MMHG | WEIGHT: 111.9 LBS

## 2020-09-18 PROCEDURE — 11981 INSERTION DRUG DLVR IMPLANT: CPT

## 2020-09-18 PROCEDURE — 99214 OFFICE O/P EST MOD 30 MIN: CPT | Mod: 25

## 2020-09-22 LAB
HCG UR QL: NEGATIVE
QUALITY CONTROL: YES

## 2020-11-12 ENCOUNTER — OUTPATIENT (OUTPATIENT)
Dept: OUTPATIENT SERVICES | Facility: HOSPITAL | Age: 25
LOS: 1 days | Discharge: HOME | End: 2020-11-12

## 2020-11-13 DIAGNOSIS — Z01.21 ENCOUNTER FOR DENTAL EXAMINATION AND CLEANING WITH ABNORMAL FINDINGS: ICD-10-CM

## 2020-11-27 ENCOUNTER — TRANSCRIPTION ENCOUNTER (OUTPATIENT)
Age: 25
End: 2020-11-27

## 2020-12-04 ENCOUNTER — OUTPATIENT (OUTPATIENT)
Dept: OUTPATIENT SERVICES | Facility: HOSPITAL | Age: 25
LOS: 1 days | Discharge: HOME | End: 2020-12-04

## 2020-12-11 ENCOUNTER — OUTPATIENT (OUTPATIENT)
Dept: OUTPATIENT SERVICES | Facility: HOSPITAL | Age: 25
LOS: 1 days | Discharge: HOME | End: 2020-12-11

## 2020-12-11 DIAGNOSIS — Z98.818 OTHER DENTAL PROCEDURE STATUS: ICD-10-CM

## 2020-12-21 PROBLEM — Z86.19 HISTORY OF CANDIDIASIS OF VAGINA: Status: RESOLVED | Noted: 2019-03-14 | Resolved: 2020-12-21

## 2021-04-26 ENCOUNTER — EMERGENCY (EMERGENCY)
Facility: HOSPITAL | Age: 26
LOS: 0 days | Discharge: HOME | End: 2021-04-26
Attending: STUDENT IN AN ORGANIZED HEALTH CARE EDUCATION/TRAINING PROGRAM
Payer: MEDICAID

## 2021-04-26 VITALS
HEIGHT: 55 IN | TEMPERATURE: 98 F | OXYGEN SATURATION: 98 % | DIASTOLIC BLOOD PRESSURE: 58 MMHG | RESPIRATION RATE: 18 BRPM | SYSTOLIC BLOOD PRESSURE: 111 MMHG | HEART RATE: 68 BPM

## 2021-04-26 DIAGNOSIS — J02.9 ACUTE PHARYNGITIS, UNSPECIFIED: ICD-10-CM

## 2021-04-26 DIAGNOSIS — R49.0 DYSPHONIA: ICD-10-CM

## 2021-04-26 DIAGNOSIS — Z20.822 CONTACT WITH AND (SUSPECTED) EXPOSURE TO COVID-19: ICD-10-CM

## 2021-04-26 LAB — SARS-COV-2 RNA SPEC QL NAA+PROBE: SIGNIFICANT CHANGE UP

## 2021-04-26 PROCEDURE — 99284 EMERGENCY DEPT VISIT MOD MDM: CPT

## 2021-04-26 RX ORDER — DEXAMETHASONE 0.5 MG/5ML
10 ELIXIR ORAL ONCE
Refills: 0 | Status: COMPLETED | OUTPATIENT
Start: 2021-04-26 | End: 2021-04-26

## 2021-04-26 RX ADMIN — Medication 10 MILLIGRAM(S): at 10:42

## 2021-04-26 NOTE — ED PROVIDER NOTE - ATTENDING CONTRIBUTION TO CARE
26 yo f no pmh presents for eval of ST. pt endorsing 2 days of ST. no f/c/difficulty breathing or swalling. no ap,n,v,d. no myalgias.     vss  gen- NAD, aaox3  HENT- pharyngreal erythema w/o swelling or exudates, uvula midline  card-rrr  lungs-ctab, no wheezing or rhonchi  neuro- full str/sensation, cn ii-xii grossly intact, normal coordination and gait      likely viral pharyngitis  will treat supportively and rec conservative care measures, pcp f/u, nsaids, steroids

## 2021-04-26 NOTE — ED PROVIDER NOTE - PHYSICAL EXAMINATION
CONSTITUTIONAL: Well-appearing; well-nourished; in no apparent distress.   EYES: PERRL; EOM intact.   ENT: No drooling. normal nose; no rhinorrhea; + mildly erythematous pharynx. No exudates. No tonsillar hypertrophy. No exudates  NECK: Supple; non-tender; no cervical lymphadenopathy.   CARDIOVASCULAR: Normal S1, S2; no murmurs, rubs, or gallops.   RESPIRATORY: Normal chest excursion with respiration; breath sounds clear and equal bilaterally; no wheezes, rhonchi, or rales.  MS: No evidence of trauma or deformity. Normal ROM in all four extremities; non-tender to palpation; distal pulses are normal.   SKIN: Normal for age and race; warm; dry; good turgor; no apparent lesions or exudate.   NEURO/PSYCH: A & O x 4; grossly unremarkable. mood and manner are appropriate. Grooming and personal hygiene are appropriate.

## 2021-04-26 NOTE — ED PROVIDER NOTE - NSFOLLOWUPINSTRUCTIONS_ED_ALL_ED_FT
Pharyngitis    Pharyngitis is inflammation of your pharynx, which is typically caused by a viral or bacterial infection. Pharyngitis can be contagious and may spread from person to person through intimate contact, coughing, sneezing, or sharing personal items and utensils. Symptoms of pharyngitis may include sore throat, fever, headache, or swollen lymph nodes. If you are prescribed antibiotics, make sure you finish them even if you start to feel better. Gargle with salt water as often as every 1-2 hours to soothe your throat. Throat lozenges (if you are not at risk for choking) or sprays may be used to soothe your throat.    SEEK IMMEDIATE MEDICAL CARE IF YOU HAVE ANY OF THE FOLLOWING SYMPTOMS: neck stiffness, drooling, hoarseness or change in voice, inability to swallow liquids, vomiting, or trouble breathing.    Please follow up with your pmd within one week.

## 2021-04-26 NOTE — ED PROVIDER NOTE - NS ED ROS FT
Constitutional: no fever, chills, no recent weight loss, change in appetite or malaise  Eyes: no redness/discharge/pain/vision changes  ENT: + sore/itchy/dry throat with assoc hoarseness. no rhinorrhea/ear pain  Cardiac: No chest pain, SOB or edema.  Respiratory: No cough or respiratory distress  GI: No nausea, vomiting, diarrhea or abdominal pain.  MS: no pain to back or extremities, no loss of ROM, no weakness  Neuro: No headache or weakness. No LOC.  Skin: No skin rash.  Except as documented in the HPI, all other systems are negative.

## 2021-04-26 NOTE — ED PROVIDER NOTE - OBJECTIVE STATEMENT
25 year old F with no pmhx c/o dry/itchy/sore throat x 2 days with assoc voice hoarseness. Denies any assoc fever/chills, nausea, vomiting, cough, rhinorrhea, ear pain, n/v/d, sick contacts, recent travel.   Hx obtained via  [facility]ice [id number]

## 2021-04-26 NOTE — ED PROVIDER NOTE - CLINICAL SUMMARY MEDICAL DECISION MAKING FREE TEXT BOX
likely viral pharyngitis  will treat supportively and rec conservative care measures, pcp f/u, nsaids, steroids

## 2021-04-26 NOTE — ED PROVIDER NOTE - PATIENT PORTAL LINK FT
You can access the FollowMyHealth Patient Portal offered by [facility] by registering at the following website: [email or web address]. By joining [email or web address]’s FollowMyHealth portal, you will also be able to view your health information using other applications (apps) compatible with our system.

## 2021-05-24 ENCOUNTER — OUTPATIENT (OUTPATIENT)
Dept: OUTPATIENT SERVICES | Facility: HOSPITAL | Age: 26
LOS: 1 days | Discharge: HOME | End: 2021-05-24

## 2021-05-24 DIAGNOSIS — K08.409 PARTIAL LOSS OF TEETH, UNSPECIFIED CAUSE, UNSPECIFIED CLASS: ICD-10-CM

## 2021-10-22 ENCOUNTER — OUTPATIENT (OUTPATIENT)
Dept: OUTPATIENT SERVICES | Facility: HOSPITAL | Age: 26
LOS: 1 days | Discharge: HOME | End: 2021-10-22

## 2021-11-26 ENCOUNTER — OUTPATIENT (OUTPATIENT)
Dept: OUTPATIENT SERVICES | Facility: HOSPITAL | Age: 26
LOS: 1 days | Discharge: HOME | End: 2021-11-26

## 2021-11-26 ENCOUNTER — APPOINTMENT (OUTPATIENT)
Dept: OBGYN | Facility: CLINIC | Age: 26
End: 2021-11-26
Payer: MEDICAID

## 2021-11-26 VITALS
SYSTOLIC BLOOD PRESSURE: 100 MMHG | HEIGHT: 55 IN | WEIGHT: 108 LBS | DIASTOLIC BLOOD PRESSURE: 70 MMHG | BODY MASS INDEX: 24.99 KG/M2

## 2021-11-26 PROCEDURE — 99395 PREV VISIT EST AGE 18-39: CPT

## 2022-08-18 ENCOUNTER — EMERGENCY (EMERGENCY)
Facility: HOSPITAL | Age: 27
LOS: 0 days | Discharge: HOME | End: 2022-08-18
Attending: EMERGENCY MEDICINE | Admitting: EMERGENCY MEDICINE

## 2022-08-18 VITALS
DIASTOLIC BLOOD PRESSURE: 58 MMHG | RESPIRATION RATE: 17 BRPM | TEMPERATURE: 97 F | HEIGHT: 55 IN | OXYGEN SATURATION: 98 % | HEART RATE: 74 BPM | SYSTOLIC BLOOD PRESSURE: 114 MMHG

## 2022-08-18 DIAGNOSIS — R50.9 FEVER, UNSPECIFIED: ICD-10-CM

## 2022-08-18 DIAGNOSIS — R05.9 COUGH, UNSPECIFIED: ICD-10-CM

## 2022-08-18 DIAGNOSIS — H92.03 OTALGIA, BILATERAL: ICD-10-CM

## 2022-08-18 DIAGNOSIS — U07.1 COVID-19: ICD-10-CM

## 2022-08-18 DIAGNOSIS — R52 PAIN, UNSPECIFIED: ICD-10-CM

## 2022-08-18 LAB — SARS-COV-2 RNA SPEC QL NAA+PROBE: DETECTED

## 2022-08-18 PROCEDURE — 99284 EMERGENCY DEPT VISIT MOD MDM: CPT

## 2022-08-18 RX ORDER — ALBUTEROL 90 UG/1
1 AEROSOL, METERED ORAL ONCE
Refills: 0 | Status: COMPLETED | OUTPATIENT
Start: 2022-08-18 | End: 2022-08-18

## 2022-08-18 RX ORDER — DEXAMETHASONE 0.5 MG/5ML
10 ELIXIR ORAL ONCE
Refills: 0 | Status: COMPLETED | OUTPATIENT
Start: 2022-08-18 | End: 2022-08-18

## 2022-08-18 RX ORDER — PSEUDOEPHEDRINE HCL 30 MG
1 TABLET ORAL
Qty: 14 | Refills: 0
Start: 2022-08-18 | End: 2022-08-24

## 2022-08-18 RX ORDER — KETOROLAC TROMETHAMINE 30 MG/ML
30 SYRINGE (ML) INJECTION ONCE
Refills: 0 | Status: DISCONTINUED | OUTPATIENT
Start: 2022-08-18 | End: 2022-08-18

## 2022-08-18 RX ADMIN — Medication 10 MILLIGRAM(S): at 21:31

## 2022-08-18 RX ADMIN — Medication 30 MILLIGRAM(S): at 21:31

## 2022-08-18 RX ADMIN — ALBUTEROL 1 PUFF(S): 90 AEROSOL, METERED ORAL at 21:30

## 2022-08-18 NOTE — ED PROVIDER NOTE - NS ED ROS FT
Constitutional: + subjective fever/chills, body aches  Eyes: no redness/discharge/pain/vision changes  ENT: + b/l ear pain, sore throat, runny nose  Cardiac: No chest pain, SOB or edema.  Respiratory: + cough. No respiratory distress  GI: No nausea, vomiting, diarrhea or abdominal pain.  : No dysuria, frequency, urgency or hematuria  MS: no pain to back or extremities, no loss of ROM, no weakness  Neuro: No headache or weakness. No LOC.  Skin: No skin rash.  Endocrine: No history of thyroid disease or diabetes.  Except as documented in the HPI, all other systems are negative.

## 2022-08-18 NOTE — ED PROVIDER NOTE - NS ED ATTENDING STATEMENT MOD
This was a shared visit with the ROBERTO. I reviewed and verified the documentation and independently performed the documented:

## 2022-08-18 NOTE — ED PROVIDER NOTE - PATIENT PORTAL LINK FT
You can access the FollowMyHealth Patient Portal offered by [facility] by registering at the following website: [email or web address]. By joining [employer]’s FollowMyHealth portal, you will also be able to view your health information using other applications (apps) compatible with our system.

## 2022-08-18 NOTE — ED ADULT NURSE NOTE - ISOLATION TYPE:
None

## 2022-08-18 NOTE — ED PROVIDER NOTE - NSFOLLOWUPINSTRUCTIONS_ED_ALL_ED_FT
If you are sick with COVID-19 or suspect you are infected with the virus that causes COVID-19, follow the steps below to help prevent the disease from spreading to people in your home and community.   https://www.cdc.gov/coronavirus/2019-ncov/downloads/sick-with-2019-nCoV-fact-sheet.pdf    Stay home except to get medical care   You should restrict activities outside your home, except for getting medical care. Do not go to work, school, or public areas. Avoid using public transportation, ride-sharing, or taxis.   Separate yourself from other people and animals in your home   People: As much as possible, you should stay in a specific room and away from other people in your home. Also, you should use a separate bathroom, if available.   Animals: Do not handle pets or other animals while sick. See COVID-19 and Animals for more information.   Call ahead before visiting your doctor   If you have a medical appointment, call the healthcare provider and tell them that you have or may have COVID-19. This will help the healthcare provider’s office take steps to keep other people from getting infected or exposed.   Wear a facemask   You should wear a facemask when you are around other people (e.g., sharing a room or vehicle) or pets and before you enter a healthcare provider’s office. If you are not able to wear a facemask (for example, because it causes trouble breathing), then people who live with you should not stay in the same room with you, or they should wear a facemask if they enter your room.   Cover your coughs and sneezes   Cover your mouth and nose with a tissue when you cough or sneeze. Throw used tissues in a lined trash can; immediately wash your hands with soap and water for at least 20 seconds or clean your hands with an alcohol-based hand  that contains at least 60% alcohol covering all surfaces of your hands and rubbing them together until they feel dry. Soap and water should be used preferentially if hands are visibly dirty.   Avoid sharing personal household items   You should not share dishes, drinking glasses, cups, eating utensils, towels, or bedding with other people or pets in your home. After using these items, they should be washed thoroughly with soap and water.   Clean your hands often   Wash your hands often with soap and water for at least 20 seconds. If soap and water are not available, clean your hands with an alcohol-based hand  that contains at least 60% alcohol, covering all surfaces of your hands and rubbing them together until they feel dry. Soap and water should be used preferentially if hands are visibly dirty. Avoid touching your eyes, nose, and mouth with unwashed hands.   Clean all “high-touch” surfaces every day   High touch surfaces include counters, tabletops, doorknobs, bathroom fixtures, toilets, phones, keyboards, tablets, and bedside tables. Also, clean any surfaces that may have blood, stool, or body fluids on them. Use a household cleaning spray or wipe, according to the label instructions. Labels contain instructions for safe and effective use of the cleaning product including precautions you should take when applying the product, such as wearing gloves and making sure you have good ventilation during use of the product.   Monitor your symptoms   Seek prompt medical attention if your illness is worsening (e.g., difficulty breathing). Before seeking care, call your healthcare provider and tell them that you have, or are being evaluated for, COVID-19. Put on a facemask before you enter the facility. These steps will help the healthcare provider’s office to keep other people in the office or waiting room from getting infected or exposed. Ask your healthcare provider to call the local or state health department. Persons who are placed under active monitoring or facilitated self-monitoring should follow instructions provided by their local health department or occupational health professionals, as appropriate. When working with your local health department check their available hours. If you have a medical emergency and need to call 911, notify the dispatch personnel that you have, or are being evaluated for COVID-19. If possible, put on a facemask before emergency medical services arrive.   Discontinuing home isolation   Patients with confirmed COVID-19 should remain under home isolation precautions until the risk of secondary transmission to others is thought to be low. The decision to discontinue home isolation precautions should be made on a case-by-case basis, in consultation with healthcare providers and state and local health departments.  For more information: www.cdc.gov/COVID19    Please follow up with your primary care doctor within one week

## 2022-08-18 NOTE — ED PROVIDER NOTE - PHYSICAL EXAMINATION
CONSTITUTIONAL: Well-appearing; well-nourished; in no apparent distress.   EYES: PERRL; EOM intact.   ENT: normal nose; no rhinorrhea; normal pharynx with no tonsillar hypertrophy. clear b/l TM  NECK: Supple; non-tender; no cervical lymphadenopathy.   CARDIOVASCULAR: Normal S1, S2; no murmurs, rubs, or gallops.   RESPIRATORY: Normal chest excursion with respiration; breath sounds clear and equal bilaterally; no wheezes, rhonchi, or rales.  GI/: Normal bowel sounds; non-distended; non-tender; no palpable organomegaly.   MS: No evidence of trauma or deformity. Normal ROM in all four extremities; non-tender to palpation; distal pulses are normal.   SKIN: Normal for age and race; warm; dry; good turgor; no apparent lesions or exudate.   NEURO/PSYCH: A & O x 4; grossly unremarkable. mood and manner are appropriate. Grooming and personal hygiene are appropriate.

## 2022-08-18 NOTE — ED PROVIDER NOTE - OBJECTIVE STATEMENT
26 year old F with no pmhx presenting to er with viral symptoms. Pt sts since Monday  has had subjective fever, chills, body aches, cough, runny nose, sore throat., b/l ear pain. denies sick contacts, recent travel, chest pain, sob, abd pain, vomiting, diarrhea, urinary symptoms, leg pain/swelling

## 2022-08-18 NOTE — ED PROVIDER NOTE - CLINICAL SUMMARY MEDICAL DECISION MAKING FREE TEXT BOX
VSS.  No hypoxia.  (+) cough.  High suspicion for COVID19 infection.  PCR pending.  Supportive care in the ED.  Strict return instructions discussed.

## 2022-08-29 ENCOUNTER — EMERGENCY (EMERGENCY)
Facility: HOSPITAL | Age: 27
LOS: 0 days | Discharge: HOME | End: 2022-08-29
Attending: STUDENT IN AN ORGANIZED HEALTH CARE EDUCATION/TRAINING PROGRAM | Admitting: STUDENT IN AN ORGANIZED HEALTH CARE EDUCATION/TRAINING PROGRAM

## 2022-08-29 VITALS
RESPIRATION RATE: 18 BRPM | HEART RATE: 74 BPM | DIASTOLIC BLOOD PRESSURE: 61 MMHG | SYSTOLIC BLOOD PRESSURE: 112 MMHG | OXYGEN SATURATION: 100 %

## 2022-08-29 VITALS
WEIGHT: 112.88 LBS | DIASTOLIC BLOOD PRESSURE: 60 MMHG | TEMPERATURE: 98 F | RESPIRATION RATE: 18 BRPM | SYSTOLIC BLOOD PRESSURE: 100 MMHG | HEIGHT: 55 IN | HEART RATE: 77 BPM | OXYGEN SATURATION: 100 %

## 2022-08-29 DIAGNOSIS — Z87.19 PERSONAL HISTORY OF OTHER DISEASES OF THE DIGESTIVE SYSTEM: ICD-10-CM

## 2022-08-29 DIAGNOSIS — R68.84 JAW PAIN: ICD-10-CM

## 2022-08-29 DIAGNOSIS — R29.898 OTHER SYMPTOMS AND SIGNS INVOLVING THE MUSCULOSKELETAL SYSTEM: ICD-10-CM

## 2022-08-29 PROCEDURE — 99283 EMERGENCY DEPT VISIT LOW MDM: CPT

## 2022-08-29 RX ORDER — METHOCARBAMOL 500 MG/1
1 TABLET, FILM COATED ORAL
Qty: 15 | Refills: 0
Start: 2022-08-29 | End: 2022-09-02

## 2022-08-29 RX ORDER — ACETAMINOPHEN 500 MG
650 TABLET ORAL ONCE
Refills: 0 | Status: COMPLETED | OUTPATIENT
Start: 2022-08-29 | End: 2022-08-29

## 2022-08-29 RX ORDER — IBUPROFEN 200 MG
600 TABLET ORAL ONCE
Refills: 0 | Status: COMPLETED | OUTPATIENT
Start: 2022-08-29 | End: 2022-08-29

## 2022-08-29 RX ADMIN — Medication 600 MILLIGRAM(S): at 21:47

## 2022-08-29 RX ADMIN — Medication 650 MILLIGRAM(S): at 21:47

## 2022-08-29 NOTE — ED ADULT NURSE NOTE - OBJECTIVE STATEMENT
Pt presented to ED c/o facial pain. Pt c/o L sided facial pain x1 week. Denies n/v/d/fevers/chills. Pt A&Ox4, ambulatory. Pt presented to ED c/o facial pain. Pt c/o L sided facial/dental pain x1 week. Denies n/v/d/fevers/chills. Airway patent, pt speaking in full sentences. Pt A&Ox4, ambulatory.

## 2022-08-29 NOTE — ED PROVIDER NOTE - NSFOLLOWUPCLINICS_GEN_ALL_ED_FT
[facility]  Dental  [address] [place] [id number]  Phone: [phone]  Fax:   Follow Up Time: 1-3 Days

## 2022-08-29 NOTE — ED PROVIDER NOTE - PATIENT PORTAL LINK FT
You can access the FollowMyHealth Patient Portal offered by [facility] by registering at the following website: [email or web address]. By joining [employer]’s FollowMyHealth portal, you will also be able to view your health information using other applications (apps) compatible with our system.

## 2022-08-29 NOTE — ED PROVIDER NOTE - OBJECTIVE STATEMENT
26 year old female, no past medical history, who presents with L jaw. patient reports L jaw pain that began this morning when brushing her teeth, states she felt clicking sensation to L TMJ. patient reports pain with ROM, opening/closing mouth. denies fever, chills, sore throat, difficulty swallowing, neck pain, drooling, nausea/vomiting, skin changes.

## 2022-08-29 NOTE — ED PROVIDER NOTE - NS ED ROS FT
Review of Systems:  	•	CONSTITUTIONAL: no feve  	•	SKIN: no rash  	•	ENT: +L jaw pain, no sore throat, no drooling   	•	GI: no nausea, no vomiting   	•	MUSCULOSKELETAL: no joint paint, no swelling, no redness  	•	NEUROLOGIC: no weakness, no headache   	•	PSYCH: no anxiety, non suicidal, non homicidal, no hallucination, no depression

## 2022-08-29 NOTE — ED PROVIDER NOTE - ATTENDING APP SHARED VISIT CONTRIBUTION OF CARE
25 yo f no pmh presents for L jaw pain. pain began when pt brushed teeth. pt states she feels clicking senastion. pain worse w/ jaw movement/swallowing. pt tolerating PO. no neck pain/swelling. no change in voice, drooling, wheezing, fever chills    vss  gen- NAD, aaox3  HENT- oropharynx clear, no drooling/stridor, tolerating secretions, normal voice, normal base of tongue, no facial swelling, mild L TMJ tenderness on palpation  Dental- TTP to tooth ##, no periapical abscess.   card-rrr  lungs-no resp distress, CTAB

## 2022-08-29 NOTE — ED PROVIDER NOTE - NSFOLLOWUPINSTRUCTIONS_ED_ALL_ED_FT
Temporomandibular Joint (TMJ) Pain    print  Print  comment  Comments  info  Am Fam Physician. 2007;76(10):4041-6723    local_library  See related article on temporomandibular joint disorders.    What is the TMJ?  The temporomandibular ([id number]-[id number]-dj-man-JESSIE-prasanna-ler) joint, or the TMJ, connects the upper and lower jawbones. This joint allows the jaw to open wide and move back and forth when you chew, talk, or yawn.    What causes TMJ pain?  There are many causes of TMJ pain. Repeated chewing (for example, chewing gum) and clenching your teeth can cause pain in the joint. Some TMJ pain has no obvious cause.    What can I do to ease the pain?  There are many things you can do to help your pain get better. When you have pain:    Eat soft foods and stay away from chewy foods (for example, taffy)    Try to use both sides of your mouth to chew    Don't chew gum    Don't open your mouth wide (for example, during yawning or singing)    Don't bite your cheeks or fingernails    Lower your amount of stress and worry    Applying a warm, damp washcloth to the joint may help.    Over-the-counter pain medicines such as ibuprofen (one brand: Advil) or acetaminophen (one brand: Tylenol) might also help. Do not use these medicines if you are allergic to them or if your doctor told you not to use them.    How can I stop the pain from coming back?  When your pain is better, you can do these exercises to make your muscles stronger and to keep the pain from coming back:    Resisted mouth opening: Place your thumb or two fingers under your chin and open your mouth slowly, pushing up lightly on your chin with your thumb. Hold for three to six seconds. Close your mouth slowly.    Resisted mouth closing: Place your thumbs under your chin and your two index fingers on the ridge between your mouth and the bottom of your chin. Push down lightly on your chin as you close your mouth.    Tongue up: Slowly open and close your mouth while keeping the tongue touching the roof of the mouth.    Side-to-side jaw movement: Place an object about one fourth of an inch thick (for example, two tongue depressors) between your front teeth. Slowly move your jaw from side to side. Increase the thickness of the object as the exercise becomes easier.    Forward jaw movement: Place an object about one fourth of an inch thick between your front teeth and move the bottom jaw forward so that the bottom teeth are in front of the top teeth. Increase the thickness of the object as the exercise becomes easier.    These exercises should not be painful. If it hurts to do these exercises, stop doing them and talk to your family doctor.    ADD/VIEW COMMENTS

## 2022-08-29 NOTE — ED PROVIDER NOTE - PHYSICAL EXAMINATION
CONSTITUTIONAL: Well-developed; well-nourished; in no acute distress, nontoxic appearing  SKIN: skin exam is warm and dry  HEAD: Normocephalic; atraumatic  ENT: +TTP overlying L TMJ, FROM of jaw, no deformity, oropharynx non-erythematous uvula midline, tolerating secretions   NECK: ROM intact  EXT: Normal ROM.   NEURO: awake, alert, following commands, oriented, grossly unremarkable. No Focal deficits. GCS 15.   PSYCH: Cooperative, appropriate.

## 2022-08-29 NOTE — ED ADULT NURSE NOTE - NSIMPLEMENTINTERV_GEN_ALL_ED
Implemented All Universal Safety Interventions:  [place] to call system. Call bell, personal items and telephone within reach. Instruct patient to call for assistance. Room bathroom lighting operational. Non-slip footwear when patient is off stretcher. Physically safe environment: no spills, clutter or unnecessary equipment. Stretcher in lowest position, wheels locked, appropriate side rails in place.

## 2022-08-31 ENCOUNTER — OUTPATIENT (OUTPATIENT)
Dept: OUTPATIENT SERVICES | Facility: HOSPITAL | Age: 27
LOS: 1 days | Discharge: HOME | End: 2022-08-31

## 2022-12-06 ENCOUNTER — EMERGENCY (EMERGENCY)
Facility: HOSPITAL | Age: 27
LOS: 0 days | Discharge: HOME | End: 2022-12-07
Attending: EMERGENCY MEDICINE | Admitting: PEDIATRICS

## 2022-12-06 VITALS
OXYGEN SATURATION: 99 % | SYSTOLIC BLOOD PRESSURE: 107 MMHG | WEIGHT: 104.94 LBS | TEMPERATURE: 99 F | HEART RATE: 66 BPM | RESPIRATION RATE: 19 BRPM | HEIGHT: 57 IN | DIASTOLIC BLOOD PRESSURE: 61 MMHG

## 2022-12-06 DIAGNOSIS — H00.013 HORDEOLUM EXTERNUM RIGHT EYE, UNSPECIFIED EYELID: ICD-10-CM

## 2022-12-06 DIAGNOSIS — H57.89 OTHER SPECIFIED DISORDERS OF EYE AND ADNEXA: ICD-10-CM

## 2022-12-06 DIAGNOSIS — H57.11 OCULAR PAIN, RIGHT EYE: ICD-10-CM

## 2022-12-06 PROCEDURE — 99283 EMERGENCY DEPT VISIT LOW MDM: CPT

## 2022-12-07 RX ORDER — ERYTHROMYCIN BASE 5 MG/GRAM
1 OINTMENT (GRAM) OPHTHALMIC (EYE)
Qty: 1 | Refills: 0
Start: 2022-12-07 | End: 2022-12-16

## 2022-12-07 NOTE — ED PROVIDER NOTE - OBJECTIVE STATEMENT
27y F no pertinent past medical history presents for evaluation of right thigh pain.  Patient has some mild burning pain localized to the right eyelid with associated redness x2 days, no inciting or relieving factors.  Denies headache, change in vision, discharge, trauma, pain with EOM

## 2022-12-07 NOTE — ED PROVIDER NOTE - NSFOLLOWUPCLINICS_GEN_ALL_ED_FT
[facility]  Ophthalmolgy  [address], [address]  [place] [address]  Phone: [phone]  Fax:

## 2022-12-07 NOTE — ED PROVIDER NOTE - PATIENT PORTAL LINK FT
You can access the FollowMyHealth Patient Portal offered by [facility] by registering at the following website: [email or web address]. By joining [facility]’s FollowMyHealth portal, you will also be able to view your health information using other applications (apps) compatible with our system.

## 2022-12-07 NOTE — ED PROVIDER NOTE - NSFOLLOWUPINSTRUCTIONS_ED_ALL_ED_FT
Follow up with PMD and Opthalmology in 1-2 days.    Stye    WHAT YOU NEED TO KNOW:    A stye is a lump on the edge or inside of your eyelid caused by an infection. A stye can form on your upper or lower eyelid. It usually goes away in 2 to 4 days.    DISCHARGE INSTRUCTIONS:    Medicines:   •Antibiotic medicine: This is given as an ointment to put into your eye. It is used to fight an infection caused by bacteria. Use as directed.     •Take your medicine as directed. Contact your healthcare provider if you think your medicine is not helping or if you have side effects. Tell him of her if you are allergic to any medicine. Keep a list of the medicines, vitamins, and herbs you take. Include the amounts, and when and why you take them. Bring the list or the pill bottles to follow-up visits. Carry your medicine list with you in case of an emergency.    Follow up with your doctor as directed: Write down your questions so you remember to ask them during your visits.     Self-care:   •Use warm compresses: This will help decrease swelling and pain. Wet a clean washcloth with warm water and place it on your eye for 10 to 15 minutes, 3 to 4 times each day or as directed.    •Keep your hands away from your eye: This helps to prevent the spread of the infection to other parts of the eye. Wash your hands often with soap and dry with a clean towel. Do not squeeze the stye.     •Do not use eye makeup: Do not wear eye makeup while you have a stye. Eye makeup may carry bacteria and cause another stye. Throw away eye makeup and brushes used to apply the makeup. Use new eye makeup after the stye has gone away. Do not share eye makeup with others  .  •Prevent another stye: Wash your face and clean your eyelashes every day. Remove eye makeup with makeup remover. This helps to completely remove eye makeup without heavy rubbing.     Contact your healthcare provider if:   •You have redness and discharge around your eye, and your eye pain is getting worse.  •Your vision changes.  •The stye has not gone away within 7 days.   •The stye comes back within a short period of time after treatment.  •You have questions or concerns about your condition or care.

## 2022-12-07 NOTE — ED PROVIDER NOTE - PHYSICAL EXAMINATION
CONST: Well appearing in NAD  EYES: PERRL, EOMI, Hordeolum externum to R eye. Vision intact 20/20 b/l. Sclera and conjunctiva clear.   ENT: No nasal discharge. Oropharynx normal appearing  NECK: Non-tender, no meningeal signs. normal ROM. supple   CARD: S1 S2; No jvd  RESP: Equal BS B/L, No wheezes, rhonchi or rales. No distress  GI: Soft, non-tender, non-distended. normal BS  MS: Normal ROM in all extremities. pulses 2 +.   SKIN: Warm, dry, no acute rashes. Good turgor  NEURO: A&Ox3, No focal deficits.

## 2022-12-07 NOTE — ED PROVIDER NOTE - NS ED ROS FT
Constitutional: (-) fever  Eyes/ENT: (+) r eye lid pain, (-) blurry vision, (-) epistaxis  Cardiovascular: (-) chest pain, (-) syncope  Respiratory: (-) cough, (-) shortness of breath  Gastrointestinal: (-) vomiting, (-) diarrhea  Musculoskeletal: (-) neck pain, (-) back pain, (-) joint pain  Integumentary: (-) rash, (-) edema  Neurological: (-) headache, (-) altered mental status  Psychiatric: (-) hallucinations  Allergic/Immunologic: (-) pruritus

## 2022-12-07 NOTE — ED PROVIDER NOTE - ATTENDING APP SHARED VISIT CONTRIBUTION OF CARE
27F no pmh p/w r eye pain x 2d. Stye appreciated on exam. Denies f/c, vision changes, painful EOM. Does not wear contacts.    PE:  nad  skin warm, dry  ncat  eyes- R eye +stye, no active drainage, conjunctivae/sclera clear, perrl/eomi; L eye nml  neck supple  rrr nl s1s2 no mrg  ctab no wrr  abd soft ntnd no palpable masses no rgr  back non-tender no cvat  ext no cce dpi  neuro aaox3 grossly nf exam

## 2022-12-09 ENCOUNTER — LABORATORY RESULT (OUTPATIENT)
Age: 27
End: 2022-12-09

## 2022-12-09 ENCOUNTER — OUTPATIENT (OUTPATIENT)
Dept: OUTPATIENT SERVICES | Facility: HOSPITAL | Age: 27
LOS: 1 days | Discharge: HOME | End: 2022-12-09

## 2022-12-09 ENCOUNTER — APPOINTMENT (OUTPATIENT)
Dept: OBGYN | Facility: CLINIC | Age: 27
End: 2022-12-09

## 2022-12-09 ENCOUNTER — RESULT CHARGE (OUTPATIENT)
Age: 27
End: 2022-12-09

## 2022-12-09 VITALS
DIASTOLIC BLOOD PRESSURE: 69 MMHG | WEIGHT: 105 LBS | BODY MASS INDEX: 24.3 KG/M2 | HEIGHT: 55 IN | SYSTOLIC BLOOD PRESSURE: 103 MMHG

## 2022-12-09 DIAGNOSIS — Z01.419 ENCOUNTER FOR GYNECOLOGICAL EXAMINATION (GENERAL) (ROUTINE) W/OUT ABNORMAL FINDINGS: ICD-10-CM

## 2022-12-09 PROCEDURE — 99395 PREV VISIT EST AGE 18-39: CPT

## 2022-12-13 LAB
CYTOLOGY CVX/VAG DOC THIN PREP: NORMAL
HCG UR QL: NEGATIVE
QUALITY CONTROL: YES

## 2023-01-23 ENCOUNTER — OUTPATIENT (OUTPATIENT)
Dept: OUTPATIENT SERVICES | Facility: HOSPITAL | Age: 28
LOS: 1 days | Discharge: HOME | End: 2023-01-23

## 2023-04-14 ENCOUNTER — OUTPATIENT (OUTPATIENT)
Dept: OUTPATIENT SERVICES | Facility: HOSPITAL | Age: 28
LOS: 1 days | End: 2023-04-14
Payer: COMMERCIAL

## 2023-04-14 DIAGNOSIS — K02.52 DENTAL CARIES ON PIT AND FISSURE SURFACE PENETRATING INTO DENTIN: ICD-10-CM

## 2023-04-14 DIAGNOSIS — K02.62 DENTAL CARIES ON SMOOTH SURFACE PENETRATING INTO DENTIN: ICD-10-CM

## 2023-04-14 PROCEDURE — D0270: CPT

## 2023-04-14 PROCEDURE — D0170: CPT

## 2023-05-08 NOTE — ED ADULT NURSE NOTE - OBJECTIVE STATEMENT
"Subjective:      Patient ID: Andrae Aviles is a 56 y.o. male.    Chief Complaint: Follow-up (6 mon)      Vitals:    05/08/23 1006   BP: 116/70   Pulse: 95   Temp: 98.1 °F (36.7 °C)   TempSrc: Oral   SpO2: 95%   Weight: 103.7 kg (228 lb 8.1 oz)   Height: 5' 10" (1.778 m)        HPI   6 months check up; jose martin knee surgery in June with Doyle  Has stents with CAD  Liver fibrosis  Off cigarettes off alcohol  Gaioned a few pounds  Told to lose it ! Tasha  Has RA    Problem List  Patient Active Problem List   Diagnosis    Primary hypertension    Rheumatoid arthritis    Elevated uric acid in blood    Anxiety    CAD (coronary artery disease)    Liver lesion    Night terrors    Pure hypercholesterolemia    Arthritis of knee, right    Elevated ferritin    Carrier of hemochromatosis HFE gene mutation    Other cirrhosis of liver        ALLERGIES: Review of patient's allergies indicates:  No Known Allergies    MEDS:   Current Outpatient Medications:     aspirin 81 MG Chew, Take 1 tablet (81 mg total) by mouth once daily., Disp: 90 tablet, Rfl: 3    atorvastatin (LIPITOR) 40 MG tablet, Take 1 tablet (40 mg total) by mouth every evening., Disp: 90 tablet, Rfl: 3    clopidogreL (PLAVIX) 75 mg tablet, Take 1 tablet (75 mg total) by mouth once daily., Disp: 90 tablet, Rfl: 11    colchicine (COLCRYS) 0.6 mg tablet, Take 1 tablet (0.6 mg total) by mouth once daily., Disp: 30 tablet, Rfl: 2    folic acid (FOLVITE) 1 MG tablet, Take 1 tablet (1,000 mcg total) by mouth once daily., Disp: 90 tablet, Rfl: 2    metoprolol succinate (TOPROL-XL) 25 MG 24 hr tablet, Take 1 tablet (25 mg total) by mouth once daily., Disp: 90 tablet, Rfl: 11    nitroGLYCERIN (NITROSTAT) 0.4 MG SL tablet, Place 1 tablet (0.4 mg total) under the tongue every 5 (five) minutes as needed for Chest pain., Disp: 30 tablet, Rfl: 3    sertraline (ZOLOFT) 50 MG tablet, TAKE 1 TABLET BY MOUTH EVERY DAY IN THE EVENING, Disp: 90 tablet, Rfl: 2    sulfaSALAzine (AZULFIDINE) " 500 MG EC tablet, Take 1 tablet (500 mg total) by mouth 2 (two) times daily., Disp: 60 tablet, Rfl: 11    valsartan (DIOVAN) 40 MG tablet, Take 1 tablet (40 mg total) by mouth once daily., Disp: 90 tablet, Rfl: 3      History:  Current Providers as of 5/8/2023  PCP: Edgar Aviles MD  Care Team Provider: Shane Lewis MD  Care Team Provider: Phani Brown MD  Care Team Provider: Kaitlyn Ribera NP  Care Team Provider: Shane Lewis MD  Care Team Provider: Smith Kwon MD  Encounter Provider: Edgar Aviles MD, starting on Mon May 8, 2023 12:00 AM  Referring Provider: not found, starting on Mon May 8, 2023 12:00 AM  Consulting Physician: Edgar Aviles MD, starting on Fri May 5, 2023  9:24 AM (Active)   Past Medical History:   Diagnosis Date    Acute combined systolic and diastolic heart failure 6/21/2022    Arthritis     rheumatoid    CAD (coronary artery disease)     Carrier of hemochromatosis HFE gene mutation 1/13/2023    Hypertension     NSTEMI (non-ST elevated myocardial infarction) 6/19/2022    Other cirrhosis of liver 1/13/2023    Primary hypertension 10/11/2021    Rheumatoid arthritis 10/11/2021     Past Surgical History:   Procedure Laterality Date    COLONOSCOPY      age 41    COLONOSCOPY N/A 11/22/2021    Procedure: COLONOSCOPY  Golytely   Rapid Covid;  Surgeon: Shane Guzman MD;  Location: [facility] ENDO;  Service: Endoscopy;  Laterality: N/A;    CORONARY ANGIOGRAPHY N/A 07/01/2022    Procedure: Angiogram, Coronary, with Left Heart Cath;  Surgeon: Miguelito Barney MD;  Location: [facility] CATH LAB;  Service: Cardiology;  Laterality: N/A;    CORONARY ANGIOGRAPHY Right 09/15/2022    Procedure: ANGIOGRAM, CORONARY ARTERY;  Surgeon: Phani Brown MD;  Location: [facility] CATH LAB/EP;  Service: Cardiology;  Laterality: Right;    LEFT HEART CATHETERIZATION N/A 06/20/2022    Procedure: Left heart cath;  Surgeon: Marlen Thompson MD;  Location: [facility] CATH LAB/EP;  Service: Cardiology;   Laterality: N/A;    LEFT HEART CATHETERIZATION Left 09/15/2022    Procedure: Left heart cath;  Surgeon: Phani Brown MD;  Location: [facility] CATH LAB/EP;  Service: Cardiology;  Laterality: Left;    VASECTOMY       Social History     Tobacco Use    Smoking status: Former     Packs/day: 1.00     Years: 15.00     Pack years: 15.00     Types: Cigarettes, Vaping with nicotine     Start date: 10/14/1985     Quit date: 6/15/2016     Years since quittin.9    Smokeless tobacco: Never   Substance Use Topics    Alcohol use: Yes     Alcohol/week: 5.0 standard drinks     Types: 5 Cans of beer per week     Comment: previousl;y 8+ beers daily for many years, stopped 2022, but resumed 2022, aware to stop    Drug use: Never         Review of Systems   Constitutional:  Negative for appetite change, fatigue, fever and unexpected weight change.   HENT:  Negative for congestion, ear pain, sinus pressure and sore throat.    Eyes:  Negative for pain and visual disturbance.   Respiratory:  Negative for shortness of breath.    Cardiovascular:  Negative for chest pain.   Gastrointestinal:  Negative for abdominal pain, constipation and diarrhea.   Endocrine: Negative for polyuria.   Genitourinary:  Negative for difficulty urinating and frequency.   Musculoskeletal:  Negative for arthralgias, back pain and myalgias.   Skin:  Negative for color change.   Allergic/Immunologic: Negative.    Neurological:  Negative for syncope, weakness and headaches.   Hematological:  Does not bruise/bleed easily.   Psychiatric/Behavioral:  Negative for dysphoric mood and suicidal ideas. The patient is not nervous/anxious.    All other systems reviewed and are negative.  Objective:     Physical Exam  Vitals and nursing note reviewed.   Constitutional:       General: He is not in acute distress.     Appearance: He is well-developed. He is not diaphoretic.   HENT:      Head: Normocephalic and atraumatic.      Right Ear: External ear normal.      Left Ear:  External ear normal.      Mouth/Throat:      Pharynx: No oropharyngeal exudate.   Eyes:      General: No scleral icterus.        Right eye: No discharge.         Left eye: No discharge.      Conjunctiva/sclera: Conjunctivae normal.      Pupils: Pupils are equal, round, and reactive to light.   Neck:      Thyroid: No thyromegaly.      Vascular: No JVD.      Trachea: No tracheal deviation.   Cardiovascular:      Rate and Rhythm: Normal rate and regular rhythm.      Heart sounds: No murmur heard.    No friction rub. No gallop.   Pulmonary:      Effort: Pulmonary effort is normal. No respiratory distress.      Breath sounds: Normal breath sounds. No stridor. No wheezing or rales.   Chest:      Chest wall: No tenderness.   Abdominal:      General: There is no distension.      Palpations: Abdomen is soft. There is no mass.      Tenderness: There is no abdominal tenderness. There is no guarding or rebound.   Musculoskeletal:         General: No tenderness. Normal range of motion.      Cervical back: Normal range of motion and neck supple.   Lymphadenopathy:      Cervical: No cervical adenopathy.   Skin:     General: Skin is warm and dry.      Coloration: Skin is not pale.      Findings: No erythema or rash.   Neurological:      Mental Status: He is alert and oriented to person, place, and time.      Cranial Nerves: No cranial nerve deficit.      Motor: No abnormal muscle tone.      Coordination: Coordination normal.      Deep Tendon Reflexes: Reflexes are normal and symmetric. Reflexes normal.   Psychiatric:         Behavior: Behavior normal.         Thought Content: Thought content normal.         Judgment: Judgment normal.           Assessment:     1. Prostate cancer screening    2. Arthritis    3. Coronary artery disease involving native coronary artery of native heart without angina pectoris    4. Rheumatoid arthritis, involving unspecified site, unspecified whether rheumatoid factor present    5. Primary hypertension     6. Elevated uric acid in blood    7. Hepatic fibrosis, stage 3    8. Anxiety      Plan:        Medication List            Accurate as of May 8, 2023 11:59 PM. If you have any questions, ask your nurse or doctor.                CONTINUE taking these medications      aspirin 81 MG Chew  Take 1 tablet (81 mg total) by mouth once daily.     atorvastatin 40 MG tablet  Commonly known as: LIPITOR  Take 1 tablet (40 mg total) by mouth every evening.     clopidogreL 75 mg tablet  Commonly known as: PLAVIX  Take 1 tablet (75 mg total) by mouth once daily.     colchicine 0.6 mg tablet  Commonly known as: COLCRYS  Take 1 tablet (0.6 mg total) by mouth once daily.     folic acid 1 MG tablet  Commonly known as: FOLVITE  Take 1 tablet (1,000 mcg total) by mouth once daily.     metoprolol succinate 25 MG 24 hr tablet  Commonly known as: TOPROL-XL  Take 1 tablet (25 mg total) by mouth once daily.     nitroGLYCERIN 0.4 MG SL tablet  Commonly known as: NITROSTAT  Place 1 tablet (0.4 mg total) under the tongue every 5 (five) minutes as needed for Chest pain.     sertraline 50 MG tablet  Commonly known as: ZOLOFT  TAKE 1 TABLET BY MOUTH EVERY DAY IN THE EVENING     sulfaSALAzine 500 MG EC tablet  Commonly known as: AZULFIDINE  Take 1 tablet (500 mg total) by mouth 2 (two) times daily.     valsartan 40 MG tablet  Commonly known as: DIOVAN  Take 1 tablet (40 mg total) by mouth once daily.            Prostate cancer screening  -     PSA, Screening; Future    Arthritis    Coronary artery disease involving native coronary artery of native heart without angina pectoris    Rheumatoid arthritis, involving unspecified site, unspecified whether rheumatoid factor present    Primary hypertension    Elevated uric acid in blood    Hepatic fibrosis, stage 3    Anxiety           pt presents to ED c/o thigh pain

## 2023-06-30 NOTE — OB RN PATIENT PROFILE - NS_GBS_INFANT_INVASIVE_OBGYN_ALL_OB_FT
Pediatric Palliative Care  and KENA Ríos attempted to visit with PT and Mother in PT room.  No one was present.  We will try again next week.     N/A

## 2023-12-01 ENCOUNTER — OUTPATIENT (OUTPATIENT)
Dept: OUTPATIENT SERVICES | Facility: HOSPITAL | Age: 28
LOS: 1 days | End: 2023-12-01
Payer: COMMERCIAL

## 2023-12-01 DIAGNOSIS — Z01.20 ENCOUNTER FOR DENTAL EXAMINATION AND CLEANING WITHOUT ABNORMAL FINDINGS: ICD-10-CM

## 2023-12-01 PROCEDURE — D0220: CPT

## 2023-12-01 PROCEDURE — D0230: CPT

## 2023-12-01 PROCEDURE — D0120: CPT

## 2023-12-01 PROCEDURE — D1120: CPT

## 2023-12-08 DIAGNOSIS — Z01.21 ENCOUNTER FOR DENTAL EXAMINATION AND CLEANING WITH ABNORMAL FINDINGS: ICD-10-CM

## 2023-12-15 ENCOUNTER — OUTPATIENT (OUTPATIENT)
Dept: OUTPATIENT SERVICES | Facility: HOSPITAL | Age: 28
LOS: 1 days | End: 2023-12-15
Payer: MEDICAID

## 2023-12-15 ENCOUNTER — APPOINTMENT (OUTPATIENT)
Dept: OBGYN | Facility: CLINIC | Age: 28
End: 2023-12-15

## 2023-12-15 ENCOUNTER — APPOINTMENT (OUTPATIENT)
Dept: OBGYN | Facility: CLINIC | Age: 28
End: 2023-12-15
Payer: MEDICAID

## 2023-12-15 VITALS
WEIGHT: 121 LBS | SYSTOLIC BLOOD PRESSURE: 100 MMHG | DIASTOLIC BLOOD PRESSURE: 60 MMHG | HEIGHT: 55 IN | BODY MASS INDEX: 28 KG/M2

## 2023-12-15 DIAGNOSIS — Z01.419 ENCOUNTER FOR GYNECOLOGICAL EXAMINATION (GENERAL) (ROUTINE) WITHOUT ABNORMAL FINDINGS: ICD-10-CM

## 2023-12-15 PROCEDURE — 11983 REMOVE/INSERT DRUG IMPLANT: CPT

## 2023-12-15 PROCEDURE — 99395 PREV VISIT EST AGE 18-39: CPT | Mod: 25

## 2023-12-15 PROCEDURE — 99395 PREV VISIT EST AGE 18-39: CPT

## 2023-12-15 NOTE — PHYSICAL EXAM
[Appropriately responsive] : appropriately responsive [Alert] : alert [No Acute Distress] : no acute distress [Soft] : soft [Non-tender] : non-tender [Non-distended] : non-distended [Examination Of The Breasts] : a normal appearance [No Masses] : no breast masses were palpable [Normal] : normal [FreeTextEntry6] : 8 week anteverted uterus, no adnexal tenderness

## 2023-12-15 NOTE — HISTORY OF PRESENT ILLNESS
[LMP unknown] : LMP unknown [N] : Patient reports normal menses [Y] : Positive pregnancy history [PapSmeardate] : 12/9/22 [TextBox_31] : AMALIA [PGHxTotal] : 3 [[facility]] : 3 [PGHxPremature] : 0 [PGHxAbortions] : 0 [[facility] Centeriving] : 3 [FreeTextEntry1] :  X3

## 2023-12-15 NOTE — DISCUSSION/SUMMARY
[FreeTextEntry1] : 28 y/o P3 with normal annual and uncomplicated nexplanon exchange - Pap smear up todate - RTC 1 year for annual or PRN

## 2023-12-18 DIAGNOSIS — Z30.017 ENCOUNTER FOR INITIAL PRESCRIPTION OF IMPLANTABLE SUBDERMAL CONTRACEPTIVE: ICD-10-CM

## 2023-12-18 DIAGNOSIS — Z01.419 ENCOUNTER FOR GYNECOLOGICAL EXAMINATION (GENERAL) (ROUTINE) WITHOUT ABNORMAL FINDINGS: ICD-10-CM

## 2023-12-18 DIAGNOSIS — Z30.46 ENCOUNTER FOR SURVEILLANCE OF IMPLANTABLE SUBDERMAL CONTRACEPTIVE: ICD-10-CM

## 2023-12-20 LAB
HCG UR QL: NEGATIVE
QUALITY CONTROL: YES

## 2024-02-06 NOTE — OB PROVIDER H&P - NSADDLPREG1_OBGYN_ALL_OB
or chronic pulmonary disease.  GI:  No change in bowel habits. No bleeding.  :  No dysuria.  No history of renal stones or renal insufficiency.  MS:  Denies osteoarthritis.  NEURO:  No stroke or seizure disorder  HEME/LYMPHATIC:  No history of bleeding tendencies.  PSYCHIATRIC:  No history of depression.  Full review of organ systems shows negative findings except for those described above.    PHYSICAL EXAMINATION:  GENERAL APPEARANCE:  Well developed.  Well nourished.  Alert, cooperative and oriented times three.  HEENT:  Normocephalic.  Extraocular muscles are intact.  No scleral icterus.  NECK/LYMPHATIC:  Supple with no carotid bruits.  No jugular venous distention.  LUNGS: Lungs are clear to auscultation.  HEART:  Heart is regular rate and rhythm without a murmur.  ABDOMEN:  Soft and non-tender.  SKIN:  No rashes, cyanosis, jaundice, ecchymoses or evidence of skin breakdown present.  EXTREMITIES:  Full range of motion. No deformity.  No peripheral edema.  VASCULAR:  Pulses are full and equal at the radial arteries and the popliteal arteries bilaterally.  There is no venous stasis disease.  NEURO:  Grossly intact.    IMPRESSION:  Encounter Diagnoses   Name Primary?    Ascending aorta dilation (HCC) Yes       PLAN:   Pt likely has normal ascending aorta for his size at 4 cm.  Increased pulmonary artery compatible with chronic sleep apnea before weight loss.  Pt has Cardiologist and future Echo exams will follow aortic size  Will not schedule future visits        NAMAN WRIGHT MD  
ADD ADDITIONAL PRIOR PREGNANCY INFORMATION...

## 2024-06-14 ENCOUNTER — OUTPATIENT (OUTPATIENT)
Dept: OUTPATIENT SERVICES | Facility: HOSPITAL | Age: 29
LOS: 1 days | End: 2024-06-14
Payer: COMMERCIAL

## 2024-06-14 DIAGNOSIS — K02.52 DENTAL CARIES ON PIT AND FISSURE SURFACE PENETRATING INTO DENTIN: ICD-10-CM

## 2024-06-14 PROCEDURE — D2140: CPT

## 2024-06-17 DIAGNOSIS — K02.9 DENTAL CARIES, UNSPECIFIED: ICD-10-CM

## 2024-06-21 ENCOUNTER — OUTPATIENT (OUTPATIENT)
Dept: OUTPATIENT SERVICES | Facility: HOSPITAL | Age: 29
LOS: 1 days | End: 2024-06-21
Payer: COMMERCIAL

## 2024-06-21 DIAGNOSIS — K02.52 DENTAL CARIES ON PIT AND FISSURE SURFACE PENETRATING INTO DENTIN: ICD-10-CM

## 2024-06-21 DIAGNOSIS — K02.63 DENTAL CARIES ON SMOOTH SURFACE PENETRATING INTO PULP: ICD-10-CM

## 2024-06-21 PROCEDURE — D2391: CPT

## 2024-10-08 ENCOUNTER — OUTPATIENT (OUTPATIENT)
Dept: OUTPATIENT SERVICES | Facility: HOSPITAL | Age: 29
LOS: 1 days | End: 2024-10-08
Payer: COMMERCIAL

## 2024-10-08 DIAGNOSIS — K02.9 DENTAL CARIES, UNSPECIFIED: ICD-10-CM

## 2024-10-08 PROCEDURE — T1013: CPT

## 2024-10-08 PROCEDURE — D2330: CPT

## 2024-10-09 DIAGNOSIS — K02.9 DENTAL CARIES, UNSPECIFIED: ICD-10-CM

## 2025-01-23 ENCOUNTER — OUTPATIENT (OUTPATIENT)
Dept: OUTPATIENT SERVICES | Facility: HOSPITAL | Age: 30
LOS: 1 days | End: 2025-01-23
Payer: COMMERCIAL

## 2025-01-23 DIAGNOSIS — Z01.20 ENCOUNTER FOR DENTAL EXAMINATION AND CLEANING WITHOUT ABNORMAL FINDINGS: ICD-10-CM

## 2025-01-23 PROCEDURE — D1110: CPT

## 2025-01-23 PROCEDURE — D0274: CPT

## 2025-01-23 PROCEDURE — D0120: CPT

## 2025-01-23 PROCEDURE — D0230: CPT

## 2025-01-24 DIAGNOSIS — Z01.21 ENCOUNTER FOR DENTAL EXAMINATION AND CLEANING WITH ABNORMAL FINDINGS: ICD-10-CM

## 2025-02-28 ENCOUNTER — OUTPATIENT (OUTPATIENT)
Dept: OUTPATIENT SERVICES | Facility: HOSPITAL | Age: 30
LOS: 1 days | End: 2025-02-28
Payer: COMMERCIAL

## 2025-02-28 ENCOUNTER — NON-APPOINTMENT (OUTPATIENT)
Age: 30
End: 2025-02-28

## 2025-02-28 ENCOUNTER — APPOINTMENT (OUTPATIENT)
Dept: OBGYN | Facility: CLINIC | Age: 30
End: 2025-02-28
Payer: COMMERCIAL

## 2025-02-28 VITALS
WEIGHT: 120 LBS | BODY MASS INDEX: 27.77 KG/M2 | DIASTOLIC BLOOD PRESSURE: 65 MMHG | SYSTOLIC BLOOD PRESSURE: 99 MMHG | HEIGHT: 55 IN

## 2025-02-28 DIAGNOSIS — Z01.419 ENCOUNTER FOR GYNECOLOGICAL EXAMINATION (GENERAL) (ROUTINE) WITHOUT ABNORMAL FINDINGS: ICD-10-CM

## 2025-02-28 DIAGNOSIS — Z01.419 ENCOUNTER FOR GYNECOLOGICAL EXAMINATION (GENERAL) (ROUTINE) W/OUT ABNORMAL FINDINGS: ICD-10-CM

## 2025-02-28 PROCEDURE — 99395 PREV VISIT EST AGE 18-39: CPT

## 2025-02-28 PROCEDURE — 99459 PELVIC EXAMINATION: CPT

## 2025-02-28 PROCEDURE — 88142 CYTOPATH C/V THIN LAYER: CPT

## 2025-02-28 PROCEDURE — 99213 OFFICE O/P EST LOW 20 MIN: CPT

## 2025-03-03 ENCOUNTER — OUTPATIENT (OUTPATIENT)
Dept: OUTPATIENT SERVICES | Facility: HOSPITAL | Age: 30
LOS: 1 days | End: 2025-03-03

## 2025-03-03 DIAGNOSIS — Z01.419 ENCOUNTER FOR GYNECOLOGICAL EXAMINATION (GENERAL) (ROUTINE) WITHOUT ABNORMAL FINDINGS: ICD-10-CM

## 2025-03-04 DIAGNOSIS — Z01.419 ENCOUNTER FOR GYNECOLOGICAL EXAMINATION (GENERAL) (ROUTINE) WITHOUT ABNORMAL FINDINGS: ICD-10-CM

## 2025-03-09 LAB — CYTOLOGY CVX/VAG DOC THIN PREP: NORMAL

## 2025-05-14 NOTE — ED PROVIDER NOTE - PRO INTERPRETER NEED 2
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 2025    Evaluating PT:  Stephan Singh, PT, DPT [id number]    Room #:  8522/8522-B  Diagnosis:  Aphasia [R47.01]  Acute ischemic left MCA stroke (HCC) [I63.512]  Right sided weakness [R53.1]  Acute CVA (cerebrovascular accident) (HCC) [I63.9]  Cerebrovascular accident (CVA) due to occlusion of left middle cerebral artery (HCC) [I63.512]  PMHx/PSHx:    Past Medical History:   Diagnosis Date    Hyperlipidemia     Hypertension     SDH (subdural hematoma) (HCC)     Traumatic head injury less than 3 months ago 10/27/15    MULTIPLE FRACTURES FACE,MANDIBLE SKULL    Traumatic subarachnoid hemorrhage with loss of consciousness of 30 minutes or less       Procedure/Surgery:   Left MCA thrombectomy, right ICA angioplasty   Precautions:  Falls, R hemiparesis, R inattention, NG tube, alarms, aphasia, impulsive   Equipment Needs: TBD    SUBJECTIVE:    Pt lives with wife and daughter in a 1 story home with 4 step(s) to enter and no rail(s).      Pt ambulated without device and was independent PTA.    OBJECTIVE:   Initial Evaluation  Date: 25 Treatment  25 Short Term/ Long Term   Goals   AM-PAC 6 Clicks  -  Pt would benefit from an intensive rehabilitation program at discharge.  15/24    Was pt agreeable to Eval/treatment? Yes Yes     Does pt have pain? No c/o pain None     Bed Mobility  Rolling: NT  Supine to sit: Michael  Sit to supine: Michael  Scooting: Michael Rolling SBA  Sit to supine SBA  Supine to sit NT  Scooting SBA Mod Independent   Transfers Sit to stand: Michael  Stand to sit: Michael  Stand pivot: ModA with HHA Sit to stand min A  Stand to sit min A  Stand pivot without device with min A Mod Independent with AAD   Ambulation   40 feet with ModA with HHA 50 feet and 75 feet  without device with min A  >400 feet with Mod Independent with AAD    Stair negotiation: ascended and descended NT  Pt pulled NGT. TF stopped. Educated patient on NGT purpose. NGT reinserted to 73 cm in R nare. Audible confirmation confirmed. Order for PCXR inserted to confirm placement.    Report called to 8SE. Transport Requested. The following patient belongings: None - Patient arrived to the unit with no belongings. were gathered and remain with the patient on transfer to their new room. Family notified in person, updated on new unit and room number.   SPEECH LANGUAGE PATHOLOGY  DAILY PROGRESS NOTE      PATIENT NAME:  Rubio Lee      :  1959          TODAY'S DATE:  2025 [facility]    Current Diet: ADULT DIET; Dysphagia - Pureed; Moderately Thick (Honey)    Patient was seen for dysphagia as well as speech and language therapy on this date. His wife was present for the session. Therapist administered trials of hard solid as well as moderately thick liquid via straw for a possible diet advancement. Patient demonstrated moderate oral phase dysphagia symptoms with hard solid food as noted by prolonged mastication and pocketing in the right buccal cavity. It should be documented that he was unaware of the pocketing. Patient benefited from minimal verbal cues to use a tongue/finger sweep to reduce pocketing. No immediate coughing was demonstrated; however, X1 delayed cough was observed after the trials were complete. Patient is recommended to advance to a minced and moist diet as minimal chewing is required. Wife and RN was informed.     Regarding speech and language, patient presents with mild-moderate receptive and moderate expressive aphasia. He answered general yes/no questions with 73% accuracy given minimal assistance. Patient benefited from rephrasing as well as increased processing time to comprehend information. He also completed sentences with 70% accuracy given minimal to moderate assistance. This is an improvement from yesterday's session. Overall, his language skills are improving.     Recommendation: Patient is recommended to advance to a minced and moist diet with moderately thick liquid via straw. Assistance is required at meals. Patient is to sit upright, take small bites/sips, and encourage oral clearance before next bite is taken. He is also recommended to continue speech therapy to improve his functional communication.       CPT code(s) 93160  speech/language tx  27450  dysphagia tx  Total minutes :  15 minutes / 15 minutes  SPEECH LANGUAGE PATHOLOGY  DAILY PROGRESS NOTE      PATIENT NAME:  Rubio Lee      :  1959          TODAY'S DATE:  2025 ROOM:  85/8522B    Current Diet: ADULT DIET; Dysphagia - Minced and Moist; Moderately Thick (Honey)    Patient was seen for ongoing dysphagia as well as speech and language therapy on this date. RN was notified that he was recommended a minced and moist diet with moderately thick liquid on 25. RN reported that he would inform the physician. During therapy, patient was administered trials of thin and moderately thick liquid as well as hard solid food. He demonstrated moderate oral phase dysphagia with hard solid food as noted by prolonged mastication and oral residue throughout the oral cavity. No oral phase dysphagia symptoms were noted with liquids. Patient displayed no overt s/s of aspiration with moderately thick liquid and hard solid food. Therapist noted intermittent coughing with thin liquids via cup. Patient is recommended to complete a modified barium swallow study to ensure that he is safely consuming the least restrictive diet.     Regarding speech and language, patient presents with mild-moderate aphasia. He was oriented x3 (I.e., person, place, and time) but required minimal verbal cues to recall situation. Patient also completed a convergent naming task to target his expressive language. When given a specific letter, he named category members with 80% accuracy given moderate assistance. Patient benefited from guided questions to improve his word retrieval skills. It should be noted that therapist observed phonemic paraphasia spelling errors while he wrote the answers to the naming task.     Recommendation: Patient is recommended a minced and moist diet with moderately thick liquid via straw. Assistance is required at meals. Patient is to sit upright, take small bites/sips, and encourage oral clearance before next bite is taken. He is also recommended to  SPEECH LANGUAGE PATHOLOGY  DAILY PROGRESS NOTE      PATIENT NAME:  Rubio Lee      :  1959          TODAY'S DATE:  2025 [facility]    Current Diet: ADULT DIET; Dysphagia - Pureed; Moderately Thick (Honey)    Patient was seen for dysphagia as well as speech and language therapy on this date. His wife and daughters were present at lunch. Patient completed a MBSS on 25 that revealed mild-moderate oropharyngeal phase dysphagia. He demonstrated laryngeal penetration during the swallow that did not clear the airway with mildly thick liquid. Patient was recommended a puree diet with moderately thick liquid. Wife reported that he tolerated dinner well last night. His daughter reported \"some\" pocketing of apple sauce after breakfast. Therapist encourage patient's family to check his oral cavity after meals to reduce pocketing. During session, he was sitting upright in his chair. Patient required set-up assistance and minimal verbal cues to slow down while eating. Mild oral phase dysphagia symptoms were observed with puree food as documented by pocketing in the right buccal cavity. Patient benefited from minimal verbal cues to alternate sips of liquid between bites of food to clear his oral cavity. No overt s/s of aspiration were noted with the puree food and moderately thick liquid via straw.     Regarding speech and language, patient presents with moderate receptive and expressive aphasia. He completed the following automatic speech tasks: count 1-10, recite days of the week, and state months of the year. Patient also completed sentences with 60% accuracy given minimal to moderate assistance. He benefits from increased processing time and verbal phonemic cues to improve his naming ability. Furthermore, patient answered simple yes/no questions with 90% accuracy given minimal verbal repetition cues. It should be noted that he demonstrated a significant improvement in accurately answering yes/no  SPEECH LANGUAGE PATHOLOGY  DAILY PROGRESS NOTE      PATIENT NAME:  Rubio Lee      :  1959          TODAY'S DATE:  2025 [facility]    Current Diet: ADULT DIET; Dysphagia - Minced and Moist; Mildly Thick (Nectar)    Patient was seen for ongoing dysphagia and speech and language therapy. Patient was alert and pleasant during the session. During the session, he completed x3 sets of 10 lingual protrusion against resistance and x3 sets of 10 chin tuck against resistance (CTAR) to increase UES relaxation diameter and increase anterior laryngeal excursion to reduce pharyngeal residuals and reduce risk of penetration and/or aspiration. Patient was administered trials of mildly thick liquid via cup. No overt s/s of aspiration were observed. He was also educated on how to complete an effortful swallow. Patient completed x10 effortful swallows to aid in his swallow function. Therapist provided a handout on dysphagia exercises to practice upon discharge.     Regarding speech and language, patient presents with mild-moderate aphasia. His expressive language is marked with anomia at the conversation level. Patient completed naming tasks to target his word finding skills. He names to category with 70% accuracy given minimal verbal cues. He required close ended questions and phonemic cues to improve his naming ability. Overall, his language skills are improving.    Recommendation: Patient is recommended to continue a minced and moist diet with mildly thick liquid. He is also recommended to continue speech therapy at discharge to improve his swallow function and communication.      CPT code(s) 52377  speech/language tx  95073  dysphagia tx  Total minutes :  15 minutes/ 15 minutes    Dillan Blackwood M.S., CCC-SLP/L   Speech-Language Pathologist  [id number]         SPEECH LANGUAGE PATHOLOGY  DAILY PROGRESS NOTE      PATIENT NAME:  Rubio Lee      :  1959          TODAY'S DATE:  2025 ROOM:  [facility]/45-A    Current Diet: Diet NPO  ADULT TUBE FEEDING; Nasogastric; Standard with Fiber; Continuous; 20; Yes; 25; Q 4 hours; 45; 30; Q 4 hours    Patient was seen for ongoing dysphagia as well as speech and language therapy on this date. Wife was present at bedside. During session, patient was sitting upright and put forth his best effort. He presents with moderate receptive and severe expressive aphasia. Patient completed automatics to improve his expressive language skills. He imitated Happy Birthday, [place], and Twinkle Twinkle Little star given increased processing time. Patient also imitated counting 1-10 and recited the days of the week as well as completed automatic phrases with 70% accuracy. He benefits from model prompts as well as verbal cues to slow down while completing automatic speech tasks. Furthermore, patient answered simple yes/no questions using gestures (e.g., head nod) with 60% accuracy given moderate assistance. It should be noted that he appeared to perseverate on yes while answering questions. Therapist provided patient with a low-tech communication board. He was able to identify functional icons (e.g., letters of the alphabet and objects) with 70% accuracy given minimal verbal repetition cues and increased processing time. Wife was educated on using the communication board with him to improve his functional communication. She verbalized her understanding. Therapist also provided patient multiple handouts on aphasia.     Regarding dysphagia, patient is NPO with ongoing trials by SLP only. Therapist administered x1 ice chip, coated spoonfuls of puree, and bites of puree via spoon. Immediate coughing was displayed with the ice chip; therefore, laryngeal penetration or aspiration cannot be ruled out. No coughing was observed with the puree  SPEECH/LANGUAGE PATHOLOGY  SPEECH/LANGUAGE/COGNITIVE EVALUATION   and PLAN OF CARE      PATIENT NAME:  Rubio Lee  (male)     MRN:  [id number]    :  1959  (66 y.o.)  STATUS:  Inpatient: Room 4522/4522-A    TODAY'S DATE:  2025  ORDER DATE, DESCRIPTION AND REFERRING PROVIDER : 25 1415    Speech Language Pathology (SLP) eval and treat  Start:  25,   End:  25 141,   ONE TIME,   Standing Count:  1 Occurrences,   R       Rock, Janis, APRN - CNP  REASON FOR REFERRAL:  Stroke/TIA   EVALUATING THERAPIST: VALERIE Post    ADMITTING DIAGNOSIS: Aphasia [R47.01]  Acute ischemic left MCA stroke (HCC) [I63.512]  Right sided weakness [R53.1]  Acute CVA (cerebrovascular accident) (HCC) [I63.9]  Cerebrovascular accident (CVA) due to occlusion of left middle cerebral artery (HCC) [I63.512]    VISIT DIAGNOSIS:   Visit Diagnoses         Codes      Acute ischemic left MCA stroke (HCC)    -  Primary I63.512               SPEECH THERAPY  PLAN OF CARE   The speech therapy  POC is established based on physician order, speech pathology diagnosis and results of clinical assessment     SPEECH PATHOLOGY DIAGNOSIS:    Receptive Aphasia (further assessment TBD), Severe Expressive with suspected apraxic component     Speech Pathology intervention is recommended up to 5 times per week for LOS or when goals are met with emphasis on the following:   Anticipate Patient will benefit from ongoing intensive speech therapy at discharge due to degree of deficits     Conditions Requiring Skilled Therapeutic Intervention for speech, language and/or cognition    Receptive Aphasia  Expressive Aphasia     Specific Speech Therapy Interventions to Include:   Receptive language training   Expressive language training     Specific instructions for next treatment:     To initiate POC    SHORT/LONG TERM GOALS  Pt will Improve receptive language skills for comprehension of simple level yes/no questions, basic commands  SPEECH/LANGUAGE PATHOLOGY  VIDEOFLUOROSCOPIC STUDY OF SWALLOWING (MBS)   and PLAN OF CARE    PATIENT NAME:  Rubio Lee  (male)     MRN:  [id number]    :  1959  (66 y.o.)  STATUS:  Inpatient: Room 8522/8522-B    TODAY'S DATE:  2025  ORDER DATE, DESCRIPTION AND REFERRING PROVIDER:  Linnette Butt, JORY - CNP  : FL MODIFIED BARIUM SWALLOW W VIDEO :  25  REASON FOR REFERRAL: Swallow study    EVALUATING THERAPIST: VALERIE Guevara      RESULTS:      DYSPHAGIA DIAGNOSIS:  Clinical indicators of mild-moderate oropharyngeal phase dysphagia     DIET RECOMMENDATIONS:  Minced and moist consistency solids (IDDSI level 5) with nectar consistency (mildly thick - IDDSI level 2) liquids; medication taken whole in puree     FEEDING RECOMMENDATIONS:    Assistance level:  Set-up is required for all oral intake  Supervision is needed during all oral intake  Encourage self-feeding as function allows  Verbal cueing for implementation of safe swallow strategies      Compensatory strategies recommended: Fully alert for all PO, Thorough oral care to prevent colonization of oral bacteria, Upright in bed/ chair as tolerated, Effortful swallow, Encourage oral clearing of bolus before next bite/sip is taken, Slow rate of intake, SINGLE cup sips, SINGLE straw sips, SMALL bites, Liquid wash to help clear oral cavity of thicker consistency items     Discussed recommendations with:  Patient  and patient nurse via phone    Laryngeal Penetration and Aspiration:  Penetration WITHOUT aspiration was observed in today's study with  mildly thick liquid (nectar)  Penetration WITH silent aspiration was observed in today's study with  thin liquid    SPEECH THERAPY  PLAN OF CARE   The dysphagia POC is established based on physician order and dysphagia diagnosis    Skilled SLP intervention for dysphagia management on acute care up to 5 x per week until goals met, pt plateaus in function and/or discharged from hospital      Conditions  SPEECH/LANGUAGE PATHOLOGY  VIDEOFLUOROSCOPIC STUDY OF SWALLOWING (MBS)   and PLAN OF CARE    PATIENT NAME:  Rubio Lee  (male)     MRN:  [id number]    :  1959  (66 y.o.)  STATUS:  Inpatient: Room 8522/8522-B    TODAY'S DATE:  2025  ORDER DATE, DESCRIPTION AND REFERRING PROVIDER:  Kurt Mohr, DO  : FL MODIFIED BARIUM SWALLOW W VIDEO :  25  REASON FOR REFERRAL: Stroke  EVALUATING THERAPIST: VALERIE Guevara      RESULTS:      DYSPHAGIA DIAGNOSIS:  Clinical indicators of mild-moderate oropharyngeal phase dysphagia     DIET RECOMMENDATIONS:  Pureed consistency solids (IDDSI level 4) with  honey consistency (moderately thick - IDDSI level 3)  liquids via straw; medications crushed in puree     FEEDING RECOMMENDATIONS:    Assistance level:  Full assistance is needed during all oral intake, Supervision is needed during all oral intake, Encourage self-feeding as function allows, Verbal cueing for implementation of safe swallow strategies      Compensatory strategies recommended: Fully alert for all PO, Thorough oral care to prevent colonization of oral bacteria, Upright in bed/ chair as tolerated, Effortful swallow, Encourage oral clearing of bolus before next bite/sip is taken, Check for oral pocketing, Slow rate of intake, SINGLE straw sips, SMALL bites, Liquid wash after thicker items to assist with clearing pharyngeal residue, Liquid wash to help clear oral cavity of thicker consistency items     Discussed recommendations with:  caregiver/family  and patient nurse in person    Laryngeal Penetration and Aspiration:  Penetration WITHOUT aspiration was observed in today's study with  mildly thick liquid (nectar)    SPEECH THERAPY  PLAN OF CARE   The dysphagia POC is established based on physician order and dysphagia diagnosis    Skilled SLP intervention for dysphagia management on acute care up to 5 x per week until goals met, pt plateaus in function and/or discharged from hospital     Stroke education packet and angioseal pamphlet provided to pts wife Nora prior to transport to new unit.   Subjective:      Rubio Lee post op follow up , thrombectomy ,  on 5/4    Patient is clinically stable     Review of Systems  Aphasia+     Objective:     Vitals:    05/05/25 1200   BP: (!) 161/92   Pulse: 71   Resp: 25   Temp:    SpO2: 94%       Expressive aphasia+  Regained motion in the rightarm ,   WEAK in the right leg  Follows commands    Overall better today     Lab Review  Lab Results   Component Value Date/Time    CHOL 176 05/05/2025 04:14 AM    CHOL 145 04/17/2019 12:48 PM    CHOL 154 10/10/2018 09:25 AM    TRIG 133 05/05/2025 04:14 AM    TRIG 62 04/17/2019 12:48 PM    TRIG 168 10/10/2018 09:25 AM    HDL 80 05/05/2025 04:14 AM    HDL 55 04/17/2019 12:48 PM    HDL 61 10/10/2018 09:25 AM         Assessment:     Acute Left MCA stroke, artery to artery emboli from chronic left common occlusion s/p successful thrombectomy     Plan:     CT shows no bleed, good sign, some residual hyperdensity in GABBIE of the left   MRI pending    Stop heparin drip  Continue ASA + Plavix   THERAPY  ST at bedside, discussed the same    Post Neuro intervention check list, Access site no issues, peripheral extremities no issues    Follow up :      Plavix  Awaiting pre cert for acute rehab  Patient is medically stable for discharge once arrangements have been made  Spouse at bedside case discussed    5/9/2025  Vital signs need  To continue DAPT  Patient is medically stable for discharge once arrangements are made  Awaiting previous  Wife at bedside Case discussed  Recheck a.m. labs  Patient can possibly get pre-CERT in the a.m.    05/10/2025  Vital signs need  To continue DAPT  Patient is medically stable for discharge once arrangements are made  Awaiting previous  VS stable  Monitor labs  Awaiting precert as discussed     05/11/2025  Acute CVA s/p thrombectomy  Continue DAPT  VS stable, continue to monitor  PT OT recommending ARU, awaiting acceptance  Continue puréed diet with honey thick liquids per SLP      Code status: FULL  Consultants: Neuro IR   DVT Prophylaxis SCD's  PT/OT  Discharge planning       JORY Keith - CNP  7:49 AM  5/11/2025   ADLs  4             Moderate/Severe disability;requires assist to ambulate/assist with ADLs  5             Severe disability;bedridden/incontinent   6               Score:   4    Recommended Adaptive Equipment: TBA: AD, ADL AE, bathroom DME     Home Living: Pt lives with wife and daughter with 1 floor plan with 4step(s) to enter and no rail(s)  Bedroom set up: first floor  Bathroom set up: walk in shower  Equipment owned: no DME     Prior Level of Function: IND with ADLs;  IND with IADLs.   No AD for ambulation.   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     Cognition: A&O:x2-3. Difficult to assess secondary to aphasia. Follows 1 step commands. Min cues for safety/insight.             Memory: difficult to assess due to aphasia             Comprehension fair- (simple instruction)              Problem solving: fair-             Judgement/safety: poor+                Communication skills: fair-. Answers yes/no appropriately.              Vision: impaired tracking to R side;  min inattention                    Glasses:eading                                                       Hearing:  WFL    Functional Assessment: AM-PAC Daily Activity Raw Score:      Initial Eval Status  Date: 25 Treatment Status  Date:25 STG=LTG  7-14  days    Feeding NPO  Min A  Assist required for setup of meal tray and to open packages/containers. Limited by decreased GM/FM coordination in R hand.                                     Mark  while seated up in chair to increase activity tolerance & hand function.            Grooming Mod A; set up  (Fair recognition of grooming supplies; requires min tactile cues to initiate demonstrating functional use- example- using toothbrush to brush teeth)  Min A  Pt demo'd ability to wash face/hands seated at EOB with assist required to for attention to R side and sequencing.                       S; set up   While standing & demonstrating  English

## 2025-06-30 ENCOUNTER — EMERGENCY (EMERGENCY)
Facility: HOSPITAL | Age: 30
LOS: 0 days | Discharge: HOME | End: 2025-06-30
Attending: EMERGENCY MEDICINE
Payer: COMMERCIAL

## 2025-06-30 VITALS
DIASTOLIC BLOOD PRESSURE: 63 MMHG | HEART RATE: 63 BPM | RESPIRATION RATE: 16 BRPM | WEIGHT: 119.93 LBS | SYSTOLIC BLOOD PRESSURE: 100 MMHG | OXYGEN SATURATION: 97 % | HEIGHT: 60 IN | TEMPERATURE: 96 F

## 2025-06-30 DIAGNOSIS — N61.0 MASTITIS WITHOUT ABSCESS: ICD-10-CM

## 2025-06-30 DIAGNOSIS — N64.4 MASTODYNIA: ICD-10-CM

## 2025-06-30 PROCEDURE — 99283 EMERGENCY DEPT VISIT LOW MDM: CPT

## 2025-06-30 RX ORDER — DOXYCYCLINE HYCLATE 100 MG
1 TABLET ORAL
Qty: 14 | Refills: 0
Start: 2025-06-30 | End: 2025-07-06

## 2025-06-30 NOTE — ED PROVIDER NOTE - PHYSICAL EXAMINATION
Physical Exam    Vital Signs: I have reviewed the initial vital signs.  Constitutional: well-nourished, appears stated age, no acute distress  Eyes: Conjunctiva pink, Sclera clear, PERRLA, EOMI.  Breast: L breast Chaperone Aleshinskaya Tenderness at 0600 position with slight overlying skin changes and liekly underlying abscess non fluctuant, no pustular DC  Musculoskeletal: supple neck, no lower extremity edema, no midline tenderness  Integumentary: warm, dry, no rash  Neurologic: awake, alert, cranial nerves II-XII grossly intact, extremities’ motor and sensory functions grossly intact  Psychiatric: appropriate mood, appropriate affect

## 2025-06-30 NOTE — ED PROVIDER NOTE - OBJECTIVE STATEMENT
Pt is a 29 year old female with PMH noted in chart presents to ED with complaints of L breast pain for past week. Breast is tender to touch as per pt, no pustular DC. no trauma to breast, no breast feeding. no hx of DM. Denies any fever, chills, bodyaches. No hx of FH of breast cancer

## 2025-06-30 NOTE — ED PROVIDER NOTE - PATIENT PORTAL LINK FT
You can access the FollowMyHealth Patient Portal offered by [facility] by registering at the following website: [email or web address]. By joining [facility]’s FollowMyHealth portal, you will also be able to view your health information using other applications (apps) compatible with our system.

## 2025-06-30 NOTE — ED PROVIDER NOTE - NSFOLLOWUPCLINICS_GEN_ALL_ED_FT
[facility]  Breast  [address]  [place] [address]  Phone: [phone]  Fax:   Follow Up Time: 1-3 Days

## 2025-06-30 NOTE — ED PROVIDER NOTE - NSFOLLOWUPINSTRUCTIONS_ED_ALL_ED_FT
Follow up with your primary medical doctor in 1-2 days as well as breast clinic. Our Emergency Department Referral Coordinators will be reaching out ot you in the next 24-48 hours from 9:00am to 5:00pm (Monday to Friday) with a follow up appointment. Please expect a phone call from the hospital in that time frame. If you do not receive a call or if you have any questions or concerns, you can reach them at [phone] or [phone].      Mastitis is an infection of breast tissue that most often occurs in women who breastfeed. It can happen any time during breastfeeding, but usually occurs within the first 3 months after giving birth. Usually only one breast is affected.  Contact your healthcare provider if:  •Your symptoms do not get better within 2 days.  •You have a painful lump in your breast.  •You have swollen and tender lymph nodes in your armpit on the same side as the affected breast.  •You have questions or concerns about your condition or care.

## 2025-06-30 NOTE — ED PROVIDER NOTE - CONSIDERATION OF ADMISSION OBSERVATION
Considered, however, patient felt better and through shared decision making, patient preferred to follow up outpatient and trial going home with continued care. Consideration of Admission/Observation

## 2025-06-30 NOTE — ED PROVIDER NOTE - CLINICAL SUMMARY MEDICAL DECISION MAKING FREE TEXT BOX
29-year-old female comes in complaining of left breast pain which started 1 week ago.  No fever/chills, trauma, nipple discharge, weight loss.  On exam, pt in NAD, AAOx3, head NC/AT, CN II-XII intact, lungs CTA B/L, CV S1S2 regular, breast (+) TTP over left breast, (+) slight erythma over area, (-) fluctuance, (-) nipple discharge, (-) axilally LAD. Abdomen soft/NT/ND/(+)BS, ext (-) edema, motor 5/5x4, sensation intact.  Will DC patient with antibiotics and breast clinic follow-up.  Return precautions provided.

## 2025-07-01 NOTE — CHART NOTE - NSCHARTNOTEFT_GEN_A_CORE
Meagan [id number] Janki Parrish would like appt. Appt scheduled 07/07/2025 10:45 AM w/ Dr. Correa @ [address] [place] [address] B, CT 7/1 (breast surgery referral).

## 2025-07-14 ENCOUNTER — APPOINTMENT (OUTPATIENT)
Dept: BREAST CENTER | Facility: CLINIC | Age: 30
End: 2025-07-14
Payer: COMMERCIAL

## 2025-07-14 VITALS
WEIGHT: 120 LBS | SYSTOLIC BLOOD PRESSURE: 104 MMHG | BODY MASS INDEX: 27.77 KG/M2 | HEIGHT: 55 IN | DIASTOLIC BLOOD PRESSURE: 62 MMHG | HEART RATE: 93 BPM

## 2025-07-14 PROBLEM — N63.42 SUBAREOLAR MASS OF LEFT BREAST: Status: ACTIVE | Noted: 2025-07-14

## 2025-07-14 PROCEDURE — 99204 OFFICE O/P NEW MOD 45 MIN: CPT

## 2025-07-22 ENCOUNTER — RESULT REVIEW (OUTPATIENT)
Age: 30
End: 2025-07-22

## 2025-07-22 ENCOUNTER — OUTPATIENT (OUTPATIENT)
Dept: OUTPATIENT SERVICES | Facility: HOSPITAL | Age: 30
LOS: 1 days | End: 2025-07-22
Payer: COMMERCIAL

## 2025-07-22 DIAGNOSIS — N63.20 UNSPECIFIED LUMP IN THE LEFT BREAST, UNSPECIFIED QUADRANT: ICD-10-CM

## 2025-07-22 DIAGNOSIS — R92.8 OTHER ABNORMAL AND INCONCLUSIVE FINDINGS ON DIAGNOSTIC IMAGING OF BREAST: ICD-10-CM

## 2025-07-22 DIAGNOSIS — N63.42 UNSPECIFIED LUMP IN LEFT BREAST, SUBAREOLAR: ICD-10-CM

## 2025-07-22 PROCEDURE — 76641 ULTRASOUND BREAST COMPLETE: CPT | Mod: 26,50

## 2025-07-22 PROCEDURE — 77066 DX MAMMO INCL CAD BI: CPT | Mod: 26

## 2025-07-22 PROCEDURE — G0279: CPT

## 2025-07-22 PROCEDURE — 77066 DX MAMMO INCL CAD BI: CPT

## 2025-07-22 PROCEDURE — 76641 ULTRASOUND BREAST COMPLETE: CPT | Mod: 50

## 2025-07-22 PROCEDURE — 77062 BREAST TOMOSYNTHESIS BI: CPT | Mod: 26

## 2025-07-23 DIAGNOSIS — N63.20 UNSPECIFIED LUMP IN THE LEFT BREAST, UNSPECIFIED QUADRANT: ICD-10-CM

## 2025-07-29 ENCOUNTER — OUTPATIENT (OUTPATIENT)
Dept: OUTPATIENT SERVICES | Facility: HOSPITAL | Age: 30
LOS: 1 days | End: 2025-07-29
Payer: COMMERCIAL

## 2025-07-29 ENCOUNTER — RESULT REVIEW (OUTPATIENT)
Age: 30
End: 2025-07-29

## 2025-07-29 ENCOUNTER — APPOINTMENT (OUTPATIENT)
Age: 30
End: 2025-07-29
Payer: COMMERCIAL

## 2025-07-29 DIAGNOSIS — R92.8 OTHER ABNORMAL AND INCONCLUSIVE FINDINGS ON DIAGNOSTIC IMAGING OF BREAST: ICD-10-CM

## 2025-07-29 PROCEDURE — 19084 BX BREAST ADD LESION US IMAG: CPT | Mod: LT

## 2025-07-29 PROCEDURE — 88305 TISSUE EXAM BY PATHOLOGIST: CPT | Mod: 26

## 2025-07-29 PROCEDURE — 76098 X-RAY EXAM SURGICAL SPECIMEN: CPT | Mod: 26

## 2025-07-29 PROCEDURE — 76098 X-RAY EXAM SURGICAL SPECIMEN: CPT

## 2025-07-29 PROCEDURE — A4648: CPT

## 2025-07-29 PROCEDURE — 88312 SPECIAL STAINS GROUP 1: CPT | Mod: 26

## 2025-07-29 PROCEDURE — 19083 BX BREAST 1ST LESION US IMAG: CPT | Mod: RT

## 2025-07-29 PROCEDURE — 88312 SPECIAL STAINS GROUP 1: CPT

## 2025-07-29 PROCEDURE — 77066 DX MAMMO INCL CAD BI: CPT

## 2025-07-29 PROCEDURE — 77066 DX MAMMO INCL CAD BI: CPT | Mod: 26

## 2025-07-29 PROCEDURE — 88305 TISSUE EXAM BY PATHOLOGIST: CPT

## 2025-08-01 DIAGNOSIS — N63.10 UNSPECIFIED LUMP IN THE RIGHT BREAST, UNSPECIFIED QUADRANT: ICD-10-CM

## 2025-08-01 DIAGNOSIS — D18.00 HEMANGIOMA UNSPECIFIED SITE: ICD-10-CM

## 2025-08-01 DIAGNOSIS — D24.1 BENIGN NEOPLASM OF RIGHT BREAST: ICD-10-CM

## 2025-08-01 DIAGNOSIS — N61.22 GRANULOMATOUS MASTITIS, LEFT BREAST: ICD-10-CM

## 2025-08-05 ENCOUNTER — NON-APPOINTMENT (OUTPATIENT)
Age: 30
End: 2025-08-05

## 2025-08-11 ENCOUNTER — OUTPATIENT (OUTPATIENT)
Dept: OUTPATIENT SERVICES | Facility: HOSPITAL | Age: 30
LOS: 1 days | End: 2025-08-11
Payer: MEDICAID

## 2025-08-11 DIAGNOSIS — Z01.20 ENCOUNTER FOR DENTAL EXAMINATION AND CLEANING WITHOUT ABNORMAL FINDINGS: ICD-10-CM

## 2025-08-11 PROCEDURE — D1110: CPT

## 2025-08-11 PROCEDURE — D0120: CPT

## 2025-08-12 ENCOUNTER — EMERGENCY (EMERGENCY)
Facility: HOSPITAL | Age: 30
LOS: 0 days | Discharge: HOME | End: 2025-08-12
Attending: STUDENT IN AN ORGANIZED HEALTH CARE EDUCATION/TRAINING PROGRAM
Payer: COMMERCIAL

## 2025-08-12 VITALS
DIASTOLIC BLOOD PRESSURE: 74 MMHG | RESPIRATION RATE: 18 BRPM | HEIGHT: 60 IN | OXYGEN SATURATION: 100 % | TEMPERATURE: 98 F | SYSTOLIC BLOOD PRESSURE: 110 MMHG | HEART RATE: 100 BPM

## 2025-08-12 DIAGNOSIS — N61.21 GRANULOMATOUS MASTITIS, RIGHT BREAST: ICD-10-CM

## 2025-08-12 DIAGNOSIS — N63.20 UNSPECIFIED LUMP IN THE LEFT BREAST, UNSPECIFIED QUADRANT: ICD-10-CM

## 2025-08-12 DIAGNOSIS — N61.0 MASTITIS WITHOUT ABSCESS: ICD-10-CM

## 2025-08-12 PROCEDURE — 99283 EMERGENCY DEPT VISIT LOW MDM: CPT

## 2025-08-12 RX ORDER — CEPHALEXIN 250 MG/1
1 CAPSULE ORAL
Qty: 28 | Refills: 0
Start: 2025-08-12 | End: 2025-08-18

## 2025-08-14 ENCOUNTER — APPOINTMENT (OUTPATIENT)
Dept: BREAST CENTER | Facility: CLINIC | Age: 30
End: 2025-08-14

## 2025-08-14 VITALS
HEIGHT: 55 IN | WEIGHT: 120 LBS | HEART RATE: 71 BPM | SYSTOLIC BLOOD PRESSURE: 118 MMHG | DIASTOLIC BLOOD PRESSURE: 66 MMHG | BODY MASS INDEX: 27.77 KG/M2

## 2025-08-14 DIAGNOSIS — N61.22 GRANULOMATOUS MASTITIS, LEFT BREAST: ICD-10-CM

## 2025-08-14 DIAGNOSIS — D24.1 BENIGN NEOPLASM OF RIGHT BREAST: ICD-10-CM

## 2025-08-14 DIAGNOSIS — R92.30 DENSE BREASTS, UNSPECIFIED: ICD-10-CM

## 2025-08-14 PROCEDURE — 99205 OFFICE O/P NEW HI 60 MIN: CPT

## 2025-08-14 PROCEDURE — G2211 COMPLEX E/M VISIT ADD ON: CPT | Mod: NC

## 2025-08-14 RX ORDER — CLINDAMYCIN HYDROCHLORIDE 300 MG/1
300 CAPSULE ORAL
Qty: 21 | Refills: 0 | Status: ACTIVE | COMMUNITY
Start: 2025-08-14 | End: 1900-01-01

## 2025-08-14 RX ORDER — METHYLPREDNISOLONE 4 MG/1
4 TABLET ORAL
Qty: 1 | Refills: 0 | Status: ACTIVE | COMMUNITY
Start: 2025-08-14 | End: 1900-01-01

## 2025-08-14 RX ORDER — DOXYCYCLINE HYCLATE 100 MG/1
100 CAPSULE ORAL
Qty: 14 | Refills: 0 | Status: ACTIVE | COMMUNITY
Start: 2025-08-14 | End: 1900-01-01

## 2025-08-15 DIAGNOSIS — Z01.21 ENCOUNTER FOR DENTAL EXAMINATION AND CLEANING WITH ABNORMAL FINDINGS: ICD-10-CM

## 2025-08-19 PROBLEM — R92.30 DENSE BREASTS: Status: ACTIVE | Noted: 2025-08-19

## 2025-08-19 PROBLEM — D24.1 BREAST FIBROADENOMA IN FEMALE, RIGHT: Status: ACTIVE | Noted: 2025-08-19

## 2025-08-27 ENCOUNTER — APPOINTMENT (OUTPATIENT)
Dept: BREAST CENTER | Facility: CLINIC | Age: 30
End: 2025-08-27
Payer: COMMERCIAL

## 2025-08-27 VITALS — HEART RATE: 93 BPM | DIASTOLIC BLOOD PRESSURE: 63 MMHG | SYSTOLIC BLOOD PRESSURE: 105 MMHG

## 2025-08-27 VITALS — HEIGHT: 55 IN | WEIGHT: 120 LBS | BODY MASS INDEX: 27.77 KG/M2

## 2025-08-27 DIAGNOSIS — D24.1 BENIGN NEOPLASM OF RIGHT BREAST: ICD-10-CM

## 2025-08-27 DIAGNOSIS — N61.1 ABSCESS OF THE BREAST AND NIPPLE: ICD-10-CM

## 2025-08-27 DIAGNOSIS — N61.22 GRANULOMATOUS MASTITIS, LEFT BREAST: ICD-10-CM

## 2025-08-27 PROCEDURE — 99215 OFFICE O/P EST HI 40 MIN: CPT

## 2025-08-27 RX ORDER — CLINDAMYCIN HYDROCHLORIDE 300 MG/1
300 CAPSULE ORAL
Qty: 14 | Refills: 0 | Status: ACTIVE | COMMUNITY
Start: 2025-08-27 | End: 1900-01-01

## 2025-08-27 RX ORDER — DOXYCYCLINE HYCLATE 100 MG/1
100 CAPSULE ORAL
Qty: 14 | Refills: 0 | Status: ACTIVE | COMMUNITY
Start: 2025-08-27 | End: 1900-01-01

## 2025-08-27 RX ORDER — OXYCODONE AND ACETAMINOPHEN 5; 325 MG/1; MG/1
5-325 TABLET ORAL
Qty: 5 | Refills: 0 | Status: ACTIVE | COMMUNITY
Start: 2025-08-27 | End: 1900-01-01

## 2025-09-02 LAB — BACTERIA FLD CULT: ABNORMAL

## 2025-09-04 ENCOUNTER — EMERGENCY (EMERGENCY)
Facility: HOSPITAL | Age: 30
LOS: 0 days | Discharge: HOME | End: 2025-09-04
Attending: STUDENT IN AN ORGANIZED HEALTH CARE EDUCATION/TRAINING PROGRAM
Payer: COMMERCIAL

## 2025-09-04 VITALS
OXYGEN SATURATION: 100 % | HEART RATE: 83 BPM | TEMPERATURE: 98 F | RESPIRATION RATE: 18 BRPM | DIASTOLIC BLOOD PRESSURE: 65 MMHG | HEIGHT: 60 IN | SYSTOLIC BLOOD PRESSURE: 102 MMHG | WEIGHT: 117.95 LBS

## 2025-09-04 DIAGNOSIS — N61.1 ABSCESS OF THE BREAST AND NIPPLE: ICD-10-CM

## 2025-09-04 PROCEDURE — 76882 US LMTD JT/FCL EVL NVASC XTR: CPT | Mod: 26,LT

## 2025-09-04 PROCEDURE — 99284 EMERGENCY DEPT VISIT MOD MDM: CPT

## 2025-09-04 PROCEDURE — 76882 US LMTD JT/FCL EVL NVASC XTR: CPT | Mod: LT

## 2025-09-04 PROCEDURE — 99284 EMERGENCY DEPT VISIT MOD MDM: CPT | Mod: 25

## 2025-09-04 RX ORDER — CLINDAMYCIN PHOSPHATE 150 MG/ML
1 VIAL (ML) INJECTION
Qty: 21 | Refills: 0
Start: 2025-09-04 | End: 2025-09-10

## 2025-09-04 RX ORDER — LIDOCAINE HCL/EPINEPHRINE/PF 1 %-1:200K
20 AMPUL (ML) INJECTION ONCE
Refills: 0 | Status: DISCONTINUED | OUTPATIENT
Start: 2025-09-04 | End: 2025-09-04

## 2025-09-04 RX ORDER — DOXYCYCLINE HYCLATE 100 MG
1 TABLET ORAL
Qty: 14 | Refills: 0
Start: 2025-09-04 | End: 2025-09-10

## 2025-09-08 ENCOUNTER — APPOINTMENT (OUTPATIENT)
Dept: BREAST CENTER | Facility: CLINIC | Age: 30
End: 2025-09-08
Payer: COMMERCIAL

## 2025-09-08 VITALS
DIASTOLIC BLOOD PRESSURE: 62 MMHG | HEIGHT: 55 IN | HEART RATE: 74 BPM | WEIGHT: 120 LBS | BODY MASS INDEX: 27.77 KG/M2 | SYSTOLIC BLOOD PRESSURE: 121 MMHG

## 2025-09-08 DIAGNOSIS — N61.1 ABSCESS OF THE BREAST AND NIPPLE: ICD-10-CM

## 2025-09-08 DIAGNOSIS — N61.22 GRANULOMATOUS MASTITIS, LEFT BREAST: ICD-10-CM

## 2025-09-08 PROCEDURE — 99214 OFFICE O/P EST MOD 30 MIN: CPT
